# Patient Record
Sex: FEMALE | Race: WHITE | NOT HISPANIC OR LATINO
[De-identification: names, ages, dates, MRNs, and addresses within clinical notes are randomized per-mention and may not be internally consistent; named-entity substitution may affect disease eponyms.]

---

## 2017-01-03 ENCOUNTER — APPOINTMENT (OUTPATIENT)
Dept: UROLOGY | Facility: CLINIC | Age: 57
End: 2017-01-03

## 2017-01-03 ENCOUNTER — OUTPATIENT (OUTPATIENT)
Dept: OUTPATIENT SERVICES | Facility: HOSPITAL | Age: 57
LOS: 1 days | End: 2017-01-03
Payer: COMMERCIAL

## 2017-01-03 DIAGNOSIS — Z90.89 ACQUIRED ABSENCE OF OTHER ORGANS: Chronic | ICD-10-CM

## 2017-01-03 DIAGNOSIS — D30.02 BENIGN NEOPLASM OF LEFT KIDNEY: Chronic | ICD-10-CM

## 2017-01-03 DIAGNOSIS — Z90.49 ACQUIRED ABSENCE OF OTHER SPECIFIED PARTS OF DIGESTIVE TRACT: Chronic | ICD-10-CM

## 2017-01-03 DIAGNOSIS — R35.0 FREQUENCY OF MICTURITION: ICD-10-CM

## 2017-01-03 PROCEDURE — 76775 US EXAM ABDO BACK WALL LIM: CPT

## 2017-01-09 DIAGNOSIS — C64.9 MALIGNANT NEOPLASM OF UNSPECIFIED KIDNEY, EXCEPT RENAL PELVIS: ICD-10-CM

## 2017-06-03 ENCOUNTER — TRANSCRIPTION ENCOUNTER (OUTPATIENT)
Age: 57
End: 2017-06-03

## 2017-10-19 ENCOUNTER — TRANSCRIPTION ENCOUNTER (OUTPATIENT)
Age: 57
End: 2017-10-19

## 2017-11-30 ENCOUNTER — FORM ENCOUNTER (OUTPATIENT)
Age: 57
End: 2017-11-30

## 2017-12-01 ENCOUNTER — APPOINTMENT (OUTPATIENT)
Dept: UROLOGY | Facility: CLINIC | Age: 57
End: 2017-12-01
Payer: COMMERCIAL

## 2017-12-01 ENCOUNTER — APPOINTMENT (OUTPATIENT)
Dept: RADIOLOGY | Facility: IMAGING CENTER | Age: 57
End: 2017-12-01
Payer: COMMERCIAL

## 2017-12-01 ENCOUNTER — OUTPATIENT (OUTPATIENT)
Dept: OUTPATIENT SERVICES | Facility: HOSPITAL | Age: 57
LOS: 1 days | End: 2017-12-01
Payer: COMMERCIAL

## 2017-12-01 VITALS
SYSTOLIC BLOOD PRESSURE: 135 MMHG | HEIGHT: 64 IN | BODY MASS INDEX: 47.12 KG/M2 | DIASTOLIC BLOOD PRESSURE: 85 MMHG | RESPIRATION RATE: 17 BRPM | WEIGHT: 276 LBS | TEMPERATURE: 98.7 F | HEART RATE: 80 BPM

## 2017-12-01 DIAGNOSIS — Z90.49 ACQUIRED ABSENCE OF OTHER SPECIFIED PARTS OF DIGESTIVE TRACT: Chronic | ICD-10-CM

## 2017-12-01 DIAGNOSIS — R35.0 FREQUENCY OF MICTURITION: ICD-10-CM

## 2017-12-01 DIAGNOSIS — C64.9 MALIGNANT NEOPLASM OF UNSPECIFIED KIDNEY, EXCEPT RENAL PELVIS: ICD-10-CM

## 2017-12-01 DIAGNOSIS — D30.02 BENIGN NEOPLASM OF LEFT KIDNEY: Chronic | ICD-10-CM

## 2017-12-01 DIAGNOSIS — C64.1 MALIGNANT NEOPLASM OF RIGHT KIDNEY, EXCEPT RENAL PELVIS: ICD-10-CM

## 2017-12-01 DIAGNOSIS — Z90.89 ACQUIRED ABSENCE OF OTHER ORGANS: Chronic | ICD-10-CM

## 2017-12-01 PROCEDURE — 99214 OFFICE O/P EST MOD 30 MIN: CPT | Mod: 25

## 2017-12-01 PROCEDURE — 76775 US EXAM ABDO BACK WALL LIM: CPT | Mod: 26

## 2017-12-01 PROCEDURE — 76775 US EXAM ABDO BACK WALL LIM: CPT

## 2017-12-01 PROCEDURE — 71046 X-RAY EXAM CHEST 2 VIEWS: CPT

## 2017-12-01 PROCEDURE — 71020: CPT | Mod: 26

## 2017-12-05 DIAGNOSIS — C64.9 MALIGNANT NEOPLASM OF UNSPECIFIED KIDNEY, EXCEPT RENAL PELVIS: ICD-10-CM

## 2018-01-09 ENCOUNTER — APPOINTMENT (OUTPATIENT)
Dept: UROLOGY | Facility: CLINIC | Age: 58
End: 2018-01-09

## 2018-12-03 ENCOUNTER — FORM ENCOUNTER (OUTPATIENT)
Age: 58
End: 2018-12-03

## 2018-12-04 ENCOUNTER — OUTPATIENT (OUTPATIENT)
Dept: OUTPATIENT SERVICES | Facility: HOSPITAL | Age: 58
LOS: 1 days | End: 2018-12-04
Payer: COMMERCIAL

## 2018-12-04 ENCOUNTER — APPOINTMENT (OUTPATIENT)
Dept: UROLOGY | Facility: CLINIC | Age: 58
End: 2018-12-04
Payer: COMMERCIAL

## 2018-12-04 ENCOUNTER — APPOINTMENT (OUTPATIENT)
Dept: RADIOLOGY | Facility: IMAGING CENTER | Age: 58
End: 2018-12-04
Payer: COMMERCIAL

## 2018-12-04 ENCOUNTER — OTHER (OUTPATIENT)
Age: 58
End: 2018-12-04

## 2018-12-04 DIAGNOSIS — Z90.49 ACQUIRED ABSENCE OF OTHER SPECIFIED PARTS OF DIGESTIVE TRACT: Chronic | ICD-10-CM

## 2018-12-04 DIAGNOSIS — Z90.89 ACQUIRED ABSENCE OF OTHER ORGANS: Chronic | ICD-10-CM

## 2018-12-04 DIAGNOSIS — C64.9 MALIGNANT NEOPLASM OF UNSPECIFIED KIDNEY, EXCEPT RENAL PELVIS: ICD-10-CM

## 2018-12-04 DIAGNOSIS — D30.02 BENIGN NEOPLASM OF LEFT KIDNEY: Chronic | ICD-10-CM

## 2018-12-04 DIAGNOSIS — R35.0 FREQUENCY OF MICTURITION: ICD-10-CM

## 2018-12-04 DIAGNOSIS — Z00.8 ENCOUNTER FOR OTHER GENERAL EXAMINATION: ICD-10-CM

## 2018-12-04 PROCEDURE — 76775 US EXAM ABDO BACK WALL LIM: CPT | Mod: 26

## 2018-12-04 PROCEDURE — 71046 X-RAY EXAM CHEST 2 VIEWS: CPT | Mod: 26

## 2018-12-04 PROCEDURE — 76775 US EXAM ABDO BACK WALL LIM: CPT

## 2018-12-04 PROCEDURE — 71046 X-RAY EXAM CHEST 2 VIEWS: CPT

## 2018-12-04 PROCEDURE — 99214 OFFICE O/P EST MOD 30 MIN: CPT | Mod: 25

## 2018-12-10 ENCOUNTER — OTHER (OUTPATIENT)
Age: 58
End: 2018-12-10

## 2018-12-11 DIAGNOSIS — C64.9 MALIGNANT NEOPLASM OF UNSPECIFIED KIDNEY, EXCEPT RENAL PELVIS: ICD-10-CM

## 2018-12-17 ENCOUNTER — OUTPATIENT (OUTPATIENT)
Dept: OUTPATIENT SERVICES | Facility: HOSPITAL | Age: 58
LOS: 1 days | End: 2018-12-17
Payer: COMMERCIAL

## 2018-12-17 ENCOUNTER — APPOINTMENT (OUTPATIENT)
Dept: MRI IMAGING | Facility: IMAGING CENTER | Age: 58
End: 2018-12-17

## 2018-12-17 DIAGNOSIS — D30.02 BENIGN NEOPLASM OF LEFT KIDNEY: Chronic | ICD-10-CM

## 2018-12-17 DIAGNOSIS — C64.1 MALIGNANT NEOPLASM OF RIGHT KIDNEY, EXCEPT RENAL PELVIS: ICD-10-CM

## 2018-12-17 DIAGNOSIS — Z90.49 ACQUIRED ABSENCE OF OTHER SPECIFIED PARTS OF DIGESTIVE TRACT: Chronic | ICD-10-CM

## 2018-12-17 DIAGNOSIS — D17.71 BENIGN LIPOMATOUS NEOPLASM OF KIDNEY: ICD-10-CM

## 2018-12-17 DIAGNOSIS — Z90.89 ACQUIRED ABSENCE OF OTHER ORGANS: Chronic | ICD-10-CM

## 2018-12-17 PROCEDURE — 82565 ASSAY OF CREATININE: CPT

## 2018-12-28 DIAGNOSIS — D17.71 BENIGN LIPOMATOUS NEOPLASM OF KIDNEY: ICD-10-CM

## 2019-05-14 ENCOUNTER — OTHER (OUTPATIENT)
Age: 59
End: 2019-05-14

## 2019-05-27 ENCOUNTER — FORM ENCOUNTER (OUTPATIENT)
Age: 59
End: 2019-05-27

## 2019-05-28 ENCOUNTER — APPOINTMENT (OUTPATIENT)
Dept: CT IMAGING | Facility: IMAGING CENTER | Age: 59
End: 2019-05-28
Payer: COMMERCIAL

## 2019-05-28 ENCOUNTER — APPOINTMENT (OUTPATIENT)
Dept: UROLOGY | Facility: CLINIC | Age: 59
End: 2019-05-28
Payer: COMMERCIAL

## 2019-05-28 ENCOUNTER — OUTPATIENT (OUTPATIENT)
Dept: OUTPATIENT SERVICES | Facility: HOSPITAL | Age: 59
LOS: 1 days | End: 2019-05-28
Payer: COMMERCIAL

## 2019-05-28 VITALS
DIASTOLIC BLOOD PRESSURE: 77 MMHG | WEIGHT: 226 LBS | RESPIRATION RATE: 17 BRPM | TEMPERATURE: 98 F | SYSTOLIC BLOOD PRESSURE: 136 MMHG | BODY MASS INDEX: 38.58 KG/M2 | HEIGHT: 64 IN | HEART RATE: 70 BPM

## 2019-05-28 DIAGNOSIS — Z90.49 ACQUIRED ABSENCE OF OTHER SPECIFIED PARTS OF DIGESTIVE TRACT: Chronic | ICD-10-CM

## 2019-05-28 DIAGNOSIS — Z90.89 ACQUIRED ABSENCE OF OTHER ORGANS: Chronic | ICD-10-CM

## 2019-05-28 DIAGNOSIS — D17.71 BENIGN LIPOMATOUS NEOPLASM OF KIDNEY: ICD-10-CM

## 2019-05-28 DIAGNOSIS — D30.02 BENIGN NEOPLASM OF LEFT KIDNEY: Chronic | ICD-10-CM

## 2019-05-28 PROCEDURE — 74170 CT ABD WO CNTRST FLWD CNTRST: CPT

## 2019-05-28 PROCEDURE — 99214 OFFICE O/P EST MOD 30 MIN: CPT

## 2019-05-28 PROCEDURE — 74170 CT ABD WO CNTRST FLWD CNTRST: CPT | Mod: 26

## 2020-06-02 ENCOUNTER — OUTPATIENT (OUTPATIENT)
Dept: OUTPATIENT SERVICES | Facility: HOSPITAL | Age: 60
LOS: 1 days | End: 2020-06-02
Payer: COMMERCIAL

## 2020-06-02 ENCOUNTER — RESULT REVIEW (OUTPATIENT)
Age: 60
End: 2020-06-02

## 2020-06-02 ENCOUNTER — APPOINTMENT (OUTPATIENT)
Dept: UROLOGY | Facility: CLINIC | Age: 60
End: 2020-06-02
Payer: COMMERCIAL

## 2020-06-02 ENCOUNTER — APPOINTMENT (OUTPATIENT)
Dept: RADIOLOGY | Facility: IMAGING CENTER | Age: 60
End: 2020-06-02
Payer: COMMERCIAL

## 2020-06-02 VITALS
WEIGHT: 226 LBS | DIASTOLIC BLOOD PRESSURE: 89 MMHG | HEIGHT: 64 IN | BODY MASS INDEX: 38.58 KG/M2 | RESPIRATION RATE: 17 BRPM | SYSTOLIC BLOOD PRESSURE: 156 MMHG | HEART RATE: 83 BPM

## 2020-06-02 VITALS — TEMPERATURE: 98.2 F

## 2020-06-02 DIAGNOSIS — Z90.89 ACQUIRED ABSENCE OF OTHER ORGANS: Chronic | ICD-10-CM

## 2020-06-02 DIAGNOSIS — D30.02 BENIGN NEOPLASM OF LEFT KIDNEY: Chronic | ICD-10-CM

## 2020-06-02 DIAGNOSIS — C64.9 MALIGNANT NEOPLASM OF UNSPECIFIED KIDNEY, EXCEPT RENAL PELVIS: ICD-10-CM

## 2020-06-02 DIAGNOSIS — Z90.49 ACQUIRED ABSENCE OF OTHER SPECIFIED PARTS OF DIGESTIVE TRACT: Chronic | ICD-10-CM

## 2020-06-02 DIAGNOSIS — R35.0 FREQUENCY OF MICTURITION: ICD-10-CM

## 2020-06-02 DIAGNOSIS — D30.00 BENIGN NEOPLASM OF UNSPECIFIED KIDNEY: ICD-10-CM

## 2020-06-02 PROCEDURE — 76775 US EXAM ABDO BACK WALL LIM: CPT | Mod: 26

## 2020-06-02 PROCEDURE — 99214 OFFICE O/P EST MOD 30 MIN: CPT | Mod: 25

## 2020-06-02 PROCEDURE — 71046 X-RAY EXAM CHEST 2 VIEWS: CPT

## 2020-06-02 PROCEDURE — 71046 X-RAY EXAM CHEST 2 VIEWS: CPT | Mod: 26

## 2020-06-02 PROCEDURE — 76775 US EXAM ABDO BACK WALL LIM: CPT

## 2020-06-02 NOTE — PHYSICAL EXAM
[General Appearance - Well Nourished] : well nourished [General Appearance - Well Developed] : well developed [Bowel Sounds] : normal bowel sounds [Skin Color & Pigmentation] : normal skin color and pigmentation [Heart Rate And Rhythm] : Heart rate and rhythm were normal [] : no respiratory distress [Oriented To Time, Place, And Person] : oriented to person, place, and time [Normal Station and Gait] : the gait and station were normal for the patient's age [No Focal Deficits] : no focal deficits [No Palpable Adenopathy] : no palpable adenopathy

## 2020-06-02 NOTE — ASSESSMENT
[FreeTextEntry1] : We reviewed the images and are concerned that she may have recurrence . We discussed MRI but she is claustrophobic. \par We will do CT abdomen  .

## 2020-06-02 NOTE — HISTORY OF PRESENT ILLNESS
[FreeTextEntry1] : Right partial nephrectomy pT1a FG 2 and left AML which was embolized. \par \par She is doing well  [None] : no symptoms

## 2020-06-03 ENCOUNTER — APPOINTMENT (OUTPATIENT)
Dept: CT IMAGING | Facility: IMAGING CENTER | Age: 60
End: 2020-06-03
Payer: COMMERCIAL

## 2020-06-03 ENCOUNTER — OUTPATIENT (OUTPATIENT)
Dept: OUTPATIENT SERVICES | Facility: HOSPITAL | Age: 60
LOS: 1 days | End: 2020-06-03
Payer: COMMERCIAL

## 2020-06-03 DIAGNOSIS — Z90.89 ACQUIRED ABSENCE OF OTHER ORGANS: Chronic | ICD-10-CM

## 2020-06-03 DIAGNOSIS — D30.02 BENIGN NEOPLASM OF LEFT KIDNEY: Chronic | ICD-10-CM

## 2020-06-03 DIAGNOSIS — C64.9 MALIGNANT NEOPLASM OF UNSPECIFIED KIDNEY, EXCEPT RENAL PELVIS: ICD-10-CM

## 2020-06-03 DIAGNOSIS — Z90.49 ACQUIRED ABSENCE OF OTHER SPECIFIED PARTS OF DIGESTIVE TRACT: Chronic | ICD-10-CM

## 2020-06-03 DIAGNOSIS — N28.89 OTHER SPECIFIED DISORDERS OF KIDNEY AND URETER: ICD-10-CM

## 2020-06-03 PROCEDURE — 74170 CT ABD WO CNTRST FLWD CNTRST: CPT | Mod: 26

## 2020-06-03 PROCEDURE — 74170 CT ABD WO CNTRST FLWD CNTRST: CPT

## 2020-06-05 DIAGNOSIS — D30.00 BENIGN NEOPLASM OF UNSPECIFIED KIDNEY: ICD-10-CM

## 2020-06-05 DIAGNOSIS — C64.9 MALIGNANT NEOPLASM OF UNSPECIFIED KIDNEY, EXCEPT RENAL PELVIS: ICD-10-CM

## 2021-07-20 ENCOUNTER — APPOINTMENT (OUTPATIENT)
Dept: UROLOGY | Facility: CLINIC | Age: 61
End: 2021-07-20

## 2023-05-22 ENCOUNTER — APPOINTMENT (OUTPATIENT)
Dept: CT IMAGING | Facility: IMAGING CENTER | Age: 63
End: 2023-05-22
Payer: MEDICARE

## 2023-05-22 ENCOUNTER — OUTPATIENT (OUTPATIENT)
Dept: OUTPATIENT SERVICES | Facility: HOSPITAL | Age: 63
LOS: 1 days | End: 2023-05-22
Payer: MEDICARE

## 2023-05-22 DIAGNOSIS — Z90.89 ACQUIRED ABSENCE OF OTHER ORGANS: Chronic | ICD-10-CM

## 2023-05-22 DIAGNOSIS — D30.02 BENIGN NEOPLASM OF LEFT KIDNEY: Chronic | ICD-10-CM

## 2023-05-22 DIAGNOSIS — C64.9 MALIGNANT NEOPLASM OF UNSPECIFIED KIDNEY, EXCEPT RENAL PELVIS: ICD-10-CM

## 2023-05-22 DIAGNOSIS — Z90.49 ACQUIRED ABSENCE OF OTHER SPECIFIED PARTS OF DIGESTIVE TRACT: Chronic | ICD-10-CM

## 2023-05-22 PROCEDURE — 74160 CT ABDOMEN W/CONTRAST: CPT | Mod: 26

## 2023-05-22 PROCEDURE — 74160 CT ABDOMEN W/CONTRAST: CPT

## 2023-05-23 ENCOUNTER — APPOINTMENT (OUTPATIENT)
Dept: UROLOGY | Facility: CLINIC | Age: 63
End: 2023-05-23

## 2023-05-23 ENCOUNTER — APPOINTMENT (OUTPATIENT)
Dept: UROLOGY | Facility: CLINIC | Age: 63
End: 2023-05-23
Payer: MEDICARE

## 2023-05-23 VITALS
HEART RATE: 80 BPM | BODY MASS INDEX: 50.02 KG/M2 | RESPIRATION RATE: 17 BRPM | TEMPERATURE: 98.5 F | SYSTOLIC BLOOD PRESSURE: 158 MMHG | WEIGHT: 293 LBS | HEIGHT: 64 IN | DIASTOLIC BLOOD PRESSURE: 94 MMHG

## 2023-05-23 DIAGNOSIS — N28.89 OTHER SPECIFIED DISORDERS OF KIDNEY AND URETER: ICD-10-CM

## 2023-05-23 DIAGNOSIS — D17.71 BENIGN LIPOMATOUS NEOPLASM OF KIDNEY: ICD-10-CM

## 2023-05-23 DIAGNOSIS — C64.9 MALIGNANT NEOPLASM OF UNSPECIFIED KIDNEY, EXCEPT RENAL PELVIS: ICD-10-CM

## 2023-05-23 PROCEDURE — 99213 OFFICE O/P EST LOW 20 MIN: CPT
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
## 2023-05-23 NOTE — PHYSICAL EXAM
[General Appearance - Well Developed] : well developed [General Appearance - Well Nourished] : well nourished [Heart Rate And Rhythm] : Heart rate and rhythm were normal [] : no respiratory distress [Oriented To Time, Place, And Person] : oriented to person, place, and time [Normal Station and Gait] : the gait and station were normal for the patient's age

## 2023-05-23 NOTE — HISTORY OF PRESENT ILLNESS
[FreeTextEntry1] : Right Partial nephrectomy in 2016 for RCC pT1a FG 2 Also a left AML which was embolized . [None] : no symptoms

## 2023-07-16 DIAGNOSIS — Z00.00 ENCOUNTER FOR GENERAL ADULT MEDICAL EXAMINATION W/OUT ABNORMAL FINDINGS: ICD-10-CM

## 2023-07-16 DIAGNOSIS — Z29.9 ENCOUNTER FOR PROPHYLACTIC MEASURES, UNSPECIFIED: ICD-10-CM

## 2023-07-17 ENCOUNTER — NON-APPOINTMENT (OUTPATIENT)
Age: 63
End: 2023-07-17

## 2023-07-17 ENCOUNTER — APPOINTMENT (OUTPATIENT)
Dept: INTERNAL MEDICINE | Facility: CLINIC | Age: 63
End: 2023-07-17
Payer: MEDICARE

## 2023-07-17 VITALS
RESPIRATION RATE: 17 BRPM | TEMPERATURE: 98.3 F | HEIGHT: 64 IN | WEIGHT: 270 LBS | HEART RATE: 65 BPM | OXYGEN SATURATION: 94 % | BODY MASS INDEX: 46.1 KG/M2

## 2023-07-17 DIAGNOSIS — E66.01 MORBID (SEVERE) OBESITY DUE TO EXCESS CALORIES: ICD-10-CM

## 2023-07-17 DIAGNOSIS — Z12.31 ENCOUNTER FOR SCREENING MAMMOGRAM FOR MALIGNANT NEOPLASM OF BREAST: ICD-10-CM

## 2023-07-17 DIAGNOSIS — Z13.31 ENCOUNTER FOR SCREENING FOR DEPRESSION: ICD-10-CM

## 2023-07-17 DIAGNOSIS — Z12.11 ENCOUNTER FOR SCREENING FOR MALIGNANT NEOPLASM OF COLON: ICD-10-CM

## 2023-07-17 PROCEDURE — G0296 VISIT TO DETERM LDCT ELIG: CPT

## 2023-07-17 PROCEDURE — G0439: CPT

## 2023-07-17 PROCEDURE — G0403: CPT

## 2023-07-17 PROCEDURE — G0402 INITIAL PREVENTIVE EXAM: CPT

## 2023-07-17 PROCEDURE — G0447 BEHAVIOR COUNSEL OBESITY 15M: CPT

## 2023-07-17 PROCEDURE — 36415 COLL VENOUS BLD VENIPUNCTURE: CPT

## 2023-07-17 NOTE — PHYSICAL EXAM
[No Acute Distress] : no acute distress [Normal Sclera/Conjunctiva] : normal sclera/conjunctiva [Normal Oropharynx] : the oropharynx was normal [PERRL] : pupils equal round and reactive to light [Normal TMs] : both tympanic membranes were normal [No Lymphadenopathy] : no lymphadenopathy [Supple] : supple [Thyroid Normal, No Nodules] : the thyroid was normal and there were no nodules present [No Edema] : there was no peripheral edema [Declined Breast Exam] : declined breast exam  [Declined Rectal Exam] : declined rectal exam [Normal Posterior Cervical Nodes] : no posterior cervical lymphadenopathy [Normal] : no jugular venous distention, supple, no lymphadenopathy and the thyroid was normal and there were no nodules present [Normal Anterior Cervical Nodes] : no anterior cervical lymphadenopathy [de-identified] : declined

## 2023-07-17 NOTE — HISTORY OF PRESENT ILLNESS
[de-identified] : 63 year F here for Complete Physical Exam.\par Pt is daily exercising?  No\par Vaccinations:\par covid - pfizer x 2\par flu - due this fall. - deferred at the moment \par tdap - got in past 10 years\par shingles - due for shingles - declined\par pcv- deferred but highly recommend\par \par Screening:\par last colonoscopy: due for one. \par denies any unintentional weight loss, blood in stool, anemia or dysphagia of solids or liquids\par no family hx of colon cancer\par \par obgyn: need one\par LMP: at 48 years old\par last pap: due for pap - will see obgyn\par last mammo: due for mammo - see obgyn\par Pregnancy:  - 1 son - 38 healthy\par no family hx of breast cancer\par \par \par Past Medical History: \par Right Partial nephrectomy in 2016 for RCC \par Patient is currently experiencing no symptoms. \par HTN amlodipine 5mg - pt reports did not take BP medication today\par Angiomyolipoma of left kidney\par Benign neoplasm of kidney\par Cancer of kidney, right \par Right renal mass \par \par Allergies: NKDA\par Medications:\par amLODIPine Besylate 5 MG Oral Tablet\par Surgical Hx:\par Cholecystectomy\par Tonsillectomy \par Partial Nephrectomy of R kidney\par Family Hx:\par Mother: ALS\par Father: CAD - at age 62 MI fatal\par Siblings: 1 brother - CAD and lung cancer passed at 55\par Children: 1 son 38\par pt denies any family hx of breast, colon, cervical, endometrial, uterine, ovarian, prostate  or testicular cancer\par Social\par ETOH - socially \par Smoker -former quit 10 years ago 1.5 ppd and started at age 13\par Illicit Drug use - denies\par Occupation: \par Pt has no acute complaints\par \par

## 2023-07-17 NOTE — COUNSELING
[Good understanding] : Patient has a good understanding of disease, goals and obesity follow-up plan [ - Annual Lung Cancer Screening/Share Decision Making Discussion] : Annual Lung Cancer Screening/Share Decision Making Discussion. (I have advised this patient to have a Low Dose CT (LDCT) scan of the lungs and have discussed the following with the patient in a shared decision making discussion:   Benefits of Detection and Early Treatment: There is adequate evidence that annual screening for lung cancer with LDCT in a population of high-risk persons can prevent a substantial number of lung cancer–related deaths. The magnitude of benefit depends on the individual patient's risk for lung cancer, as those who are at highest risk are most likely to benefit. Screening cannot prevent most lung cancer–related deaths, and does not replace smoking cessation. Harms of Detection and Early Intervention and Treatment: The harms associated with LDCT screening include false-negative and false-positive results, incidental findings, over diagnosis, and radiation exposure. False-positive LDCT results occur in a substantial proportion of screened persons; 95% of all positive results do not lead to a diagnosis of cancer. In a high-quality screening program, further imaging can resolve most false-positive results; however, some patients may require invasive procedures. Radiation harms, including cancer resulting from cumulative exposure to radiation, vary depending on the age at the start of screening; the number of scans received; and the person's exposure to other sources of radiation, particularly other medical imaging.) [FreeTextEntry4] : 15

## 2023-07-17 NOTE — HEALTH RISK ASSESSMENT
[No] : No [No falls in past year] : Patient reported no falls in the past year [PHQ-2 Negative - No further assessment needed] : PHQ-2 Negative - No further assessment needed [0] : 2) Feeling down, depressed, or hopeless: Not at all (0) [None] : None [With Family] : lives with family [Feels Safe at Home] : Feels safe at home [Fully functional (bathing, dressing, toileting, transferring, walking, feeding)] : Fully functional (bathing, dressing, toileting, transferring, walking, feeding) [Fully functional (using the telephone, shopping, preparing meals, housekeeping, doing laundry, using] : Fully functional and needs no help or supervision to perform IADLs (using the telephone, shopping, preparing meals, housekeeping, doing laundry, using transportation, managing medications and managing finances) [Reports normal functional visual acuity (ie: able to read med bottle)] : Reports normal functional visual acuity [Smoke Detector] : smoke detector [Seat Belt] :  uses seat belt [Sunscreen] : uses sunscreen [With Patient/Caregiver] : , with patient/caregiver [Designated Healthcare Proxy] : Designated healthcare proxy [Name: ___] : Health Care Proxy's Name: [unfilled]  [Relationship: ___] : Relationship: [unfilled] [I will adhere to the patient's wishes.] : I will adhere to the patient's wishes. [Time Spent: ___ minutes] : Time Spent: [unfilled] minutes [Former] : Former [HIV test declined] : HIV test declined [Hepatitis C test offered] : Hepatitis C test offered [Employed] : employed [High School] : high school [# Of Children ___] : has [unfilled] children [Carbon Monoxide Detector] : carbon monoxide detector [Safety elements used in home] : safety elements used in home [< 15 Years] : < 15 Years [[id number]] : 0 [Change in mental status noted] : No change in mental status noted [Language] : denies difficulty with language [Behavior] : denies difficulty with behavior [Learning/Retaining New Information] : denies difficulty learning/retaining new information [Handling Complex Tasks] : denies difficulty handling complex tasks [Reasoning] : denies difficulty with reasoning [Spatial Ability and Orientation] : denies difficulty with spatial ability and orientation [Sexually Active] : not sexually active [High Risk Behavior] : no high risk behavior [Reports changes in hearing] : Reports no changes in hearing [Reports changes in vision] : Reports no changes in vision [Reports changes in dental health] : Reports no changes in dental health [Guns at Home] : no guns at home [Travel to Developing Areas] : does not  travel to developing areas [TB Exposure] : is not being exposed to tuberculosis [Caregiver Concerns] : does not have caregiver concerns [FreeTextEntry4] : TBD

## 2023-07-17 NOTE — ASSESSMENT
[FreeTextEntry1] : #CPE\par f/u blood and urine\par ekg - NSR hx or RBBB\par immunizations - declined shingles, pcv and flu shot\par I spent 5 minutes with the patient conducting a screen using approved screening tool (PHQ2) and discussing results of said screen with patient during this encounter.\par The patient was counseled to get regular exercise and sleep, wear sunblock and wear a safety belt in the car.\par Check CBC, CMP Hgba1c , TSH and UA\par Pap Smear\par Mammo\par Colonoscopy \par Ophtho\par Derm\par Dental\par Cardio - following Dr. Harmon at [facility] - pt will send records from Dr. Harmon office\par #Severe Obesity\par Diet and Exercise\par Setting realistic weight loss goals, such as a one- to two-pound weight loss per week.\par Eating fewer calories by cutting down on portion sizes. \par Aiming for at least five small handfuls of fruits and vegetables per day.\par Choosing foods high in fiber, such as whole grain breads, cereals, pasta, rice, fruits and vegetables. These foods will give you more chewing satisfaction, while the higher fiber content may make you feel castrejon on fewer calories.\par Keep Food Journal\par 15 min spent on obesity discussion\par #RCC following urology Dr. Troy\par #Lung Cancer Screening\par f/u low dose CT of chest\par #HTN\par renewal of amlodpine 5mg qd\par f/u 3 weeks for bp check\par DASH DIET \par Exercise.\par Lower your salt intake.\par Reduce your alcohol consumption.\par Learn relaxation methods.\par Eating is a very important part of controlling blood pressure. Recommend Total salt intake to 2.4g/day.\par Do not add salt while preparing or eating your meals.\par Try to avoid red meats, sweets and sugar containing beverages.\par Ensure you are eating 5 fruits and vegetables a day as well as whole grains.\par \par pt agrees and understands plan via teach back method. all questions answered.\par \par

## 2023-07-18 DIAGNOSIS — R82.90 UNSPECIFIED ABNORMAL FINDINGS IN URINE: ICD-10-CM

## 2023-07-18 DIAGNOSIS — E55.9 VITAMIN D DEFICIENCY, UNSPECIFIED: ICD-10-CM

## 2023-07-18 LAB
25(OH)D3 SERPL-MCNC: 10.7 NG/ML
ALBUMIN SERPL ELPH-MCNC: 4.8 G/DL
ALP BLD-CCNC: 103 U/L
ALT SERPL-CCNC: 21 U/L
ANION GAP SERPL CALC-SCNC: 15 MMOL/L
APPEARANCE: ABNORMAL
AST SERPL-CCNC: 17 U/L
BACTERIA: ABNORMAL /HPF
BILIRUB SERPL-MCNC: 0.4 MG/DL
BILIRUBIN URINE: NEGATIVE
BLOOD URINE: NEGATIVE
BUN SERPL-MCNC: 14 MG/DL
CALCIUM OXALATE CRYSTALS: PRESENT
CALCIUM SERPL-MCNC: 10 MG/DL
CAST: 0 /LPF
CHLORIDE SERPL-SCNC: 106 MMOL/L
CHOLEST SERPL-MCNC: 140 MG/DL
CO2 SERPL-SCNC: 23 MMOL/L
COLOR: NORMAL
CREAT SERPL-MCNC: 0.7 MG/DL
EGFR: 96 ML/MIN/1.73M2
EPITHELIAL CELLS: 13 /HPF
ESTIMATED AVERAGE GLUCOSE: 123 MG/DL
FOLATE SERPL-MCNC: 8.3 NG/ML
GLUCOSE QUALITATIVE U: NEGATIVE MG/DL
GLUCOSE SERPL-MCNC: 99 MG/DL
HBA1C MFR BLD HPLC: 5.9 %
HBV SURFACE AB SERPL IA-ACNC: <3 MIU/ML
HCV AB SER QL: NONREACTIVE
HCV S/CO RATIO: 0.1 S/CO
HDLC SERPL-MCNC: 42 MG/DL
KETONES URINE: NEGATIVE MG/DL
LDLC SERPL CALC-MCNC: 76 MG/DL
LEUKOCYTE ESTERASE URINE: NEGATIVE
MICROSCOPIC-UA: NORMAL
NITRITE URINE: NEGATIVE
NONHDLC SERPL-MCNC: 98 MG/DL
PH URINE: 5.5
POTASSIUM SERPL-SCNC: 4 MMOL/L
PROT SERPL-MCNC: 7.6 G/DL
PROTEIN URINE: 30 MG/DL
RED BLOOD CELLS URINE: NORMAL /HPF
SODIUM SERPL-SCNC: 144 MMOL/L
SPECIFIC GRAVITY URINE: 1.03
TRIGL SERPL-MCNC: 124 MG/DL
TSH SERPL-ACNC: 0.52 UIU/ML
UROBILINOGEN URINE: 1 MG/DL
VIT B12 SERPL-MCNC: 300 PG/ML
WHITE BLOOD CELLS URINE: 2 /HPF

## 2023-07-27 ENCOUNTER — NON-APPOINTMENT (OUTPATIENT)
Age: 63
End: 2023-07-27

## 2023-07-27 VITALS — WEIGHT: 270 LBS | HEIGHT: 64 IN | BODY MASS INDEX: 46.1 KG/M2

## 2023-07-27 DIAGNOSIS — Z87.891 PERSONAL HISTORY OF NICOTINE DEPENDENCE: ICD-10-CM

## 2023-07-27 NOTE — HISTORY OF PRESENT ILLNESS
[Former] : Former [TextBox_13] : Referred by Dr. Gabe Greene.\par \par Ms. PURI is a 63 year old female with a history of HTN, right renal cell ca s/p partial nephrectomy.\par \par She was called to review eligibility for Low-Dose CT lung cancer screening.  Reviewed and confirmed that the patient meets screening eligibility criteria:\par \par 63 years old \par \par Smoking Status: Former smoker  \par \par Number of pack(s) per day: 1.5\par Number of years smoked: 40\par Number of pack years smokin\par \par Number of years since quitting smokin\par Quit year: \par \par No symptoms of lung cancer, including new cough, change in cough, hemoptysis, and unintentional weight loss.\par \par No personal history of lung cancer.  History of lung cancer in a first degree relative, her brother.  No history of lung disease or occupational exposures. [YearQuit] : 2011 [PacksperDay] : 1.5 [N_Years] : 40 [PacksperYear] : 60

## 2023-07-31 ENCOUNTER — APPOINTMENT (OUTPATIENT)
Dept: CT IMAGING | Facility: CLINIC | Age: 63
End: 2023-07-31
Payer: MEDICARE

## 2023-07-31 PROCEDURE — 71271 CT THORAX LUNG CANCER SCR C-: CPT

## 2023-08-02 ENCOUNTER — RESULT REVIEW (OUTPATIENT)
Age: 63
End: 2023-08-02

## 2023-08-02 ENCOUNTER — NON-APPOINTMENT (OUTPATIENT)
Age: 63
End: 2023-08-02

## 2023-08-02 DIAGNOSIS — R91.1 SOLITARY PULMONARY NODULE: ICD-10-CM

## 2023-08-05 NOTE — PHYSICAL EXAM
[No Edema] : there was no peripheral edema [Normal Posterior Cervical Nodes] : no posterior cervical lymphadenopathy [Normal Anterior Cervical Nodes] : no anterior cervical lymphadenopathy [Normal] : affect was normal and insight and judgment were intact

## 2023-08-05 NOTE — HISTORY OF PRESENT ILLNESS
[de-identified] : 63 F here for f/u HTN currently on amlodipine 5mg qd compliant with medication does not report any headache, blurry vision, epistaxis, dizziness, chest pain, sob, or palpitations or leg swelling Following a low salt diet and exercising vit d 10.7 - - need to start drisdol 50,000 units 1 capsule qweek for 8 weeks followed by 2000 units D3 otc once a day for 3 months.- sent rx low hep b immunity need x 2 shots 1 month apart. a1c 5.9 prediabetic. continue to watch sugar, low carb diet, low starch, diet and exercise. lifestyle modification.

## 2023-08-07 ENCOUNTER — TRANSCRIPTION ENCOUNTER (OUTPATIENT)
Age: 63
End: 2023-08-07

## 2023-08-07 ENCOUNTER — APPOINTMENT (OUTPATIENT)
Dept: INTERNAL MEDICINE | Facility: CLINIC | Age: 63
End: 2023-08-07

## 2023-08-07 ENCOUNTER — RESULT REVIEW (OUTPATIENT)
Age: 63
End: 2023-08-07

## 2023-08-07 ENCOUNTER — APPOINTMENT (OUTPATIENT)
Dept: MAMMOGRAPHY | Facility: CLINIC | Age: 63
End: 2023-08-07
Payer: MEDICARE

## 2023-08-07 ENCOUNTER — APPOINTMENT (OUTPATIENT)
Dept: ULTRASOUND IMAGING | Facility: CLINIC | Age: 63
End: 2023-08-07
Payer: MEDICARE

## 2023-08-07 ENCOUNTER — NON-APPOINTMENT (OUTPATIENT)
Age: 63
End: 2023-08-07

## 2023-08-07 DIAGNOSIS — R92.8 OTHER ABNORMAL AND INCONCLUSIVE FINDINGS ON DIAGNOSTIC IMAGING OF BREAST: ICD-10-CM

## 2023-08-07 PROCEDURE — 77066 DX MAMMO INCL CAD BI: CPT

## 2023-08-07 PROCEDURE — 76641 ULTRASOUND BREAST COMPLETE: CPT | Mod: LT

## 2023-08-07 PROCEDURE — G0279: CPT

## 2023-08-11 DIAGNOSIS — F41.9 ANXIETY DISORDER, UNSPECIFIED: ICD-10-CM

## 2023-08-14 ENCOUNTER — RESULT REVIEW (OUTPATIENT)
Age: 63
End: 2023-08-14

## 2023-08-14 ENCOUNTER — APPOINTMENT (OUTPATIENT)
Dept: ULTRASOUND IMAGING | Facility: CLINIC | Age: 63
End: 2023-08-14
Payer: MEDICARE

## 2023-08-14 ENCOUNTER — OUTPATIENT (OUTPATIENT)
Dept: OUTPATIENT SERVICES | Facility: HOSPITAL | Age: 63
LOS: 1 days | End: 2023-08-14
Payer: MEDICARE

## 2023-08-14 ENCOUNTER — APPOINTMENT (OUTPATIENT)
Dept: INTERNAL MEDICINE | Facility: CLINIC | Age: 63
End: 2023-08-14

## 2023-08-14 DIAGNOSIS — Z12.31 ENCOUNTER FOR SCREENING MAMMOGRAM FOR MALIGNANT NEOPLASM OF BREAST: ICD-10-CM

## 2023-08-14 DIAGNOSIS — Z90.89 ACQUIRED ABSENCE OF OTHER ORGANS: Chronic | ICD-10-CM

## 2023-08-14 DIAGNOSIS — D30.02 BENIGN NEOPLASM OF LEFT KIDNEY: Chronic | ICD-10-CM

## 2023-08-14 DIAGNOSIS — Z90.49 ACQUIRED ABSENCE OF OTHER SPECIFIED PARTS OF DIGESTIVE TRACT: Chronic | ICD-10-CM

## 2023-08-14 PROCEDURE — 77065 DX MAMMO INCL CAD UNI: CPT

## 2023-08-14 PROCEDURE — 19083 BX BREAST 1ST LESION US IMAG: CPT | Mod: LT

## 2023-08-14 PROCEDURE — 88360 TUMOR IMMUNOHISTOCHEM/MANUAL: CPT | Mod: 26

## 2023-08-14 PROCEDURE — 19083 BX BREAST 1ST LESION US IMAG: CPT

## 2023-08-14 PROCEDURE — 88360 TUMOR IMMUNOHISTOCHEM/MANUAL: CPT

## 2023-08-14 PROCEDURE — 77065 DX MAMMO INCL CAD UNI: CPT | Mod: 26,LT

## 2023-08-14 PROCEDURE — 88305 TISSUE EXAM BY PATHOLOGIST: CPT

## 2023-08-14 PROCEDURE — A4648: CPT

## 2023-08-14 PROCEDURE — 88305 TISSUE EXAM BY PATHOLOGIST: CPT | Mod: 26

## 2023-08-16 ENCOUNTER — NON-APPOINTMENT (OUTPATIENT)
Age: 63
End: 2023-08-16

## 2023-08-17 ENCOUNTER — TRANSCRIPTION ENCOUNTER (OUTPATIENT)
Age: 63
End: 2023-08-17

## 2023-08-18 ENCOUNTER — TRANSCRIPTION ENCOUNTER (OUTPATIENT)
Age: 63
End: 2023-08-18

## 2023-08-21 ENCOUNTER — APPOINTMENT (OUTPATIENT)
Dept: SURGICAL ONCOLOGY | Facility: CLINIC | Age: 63
End: 2023-08-21
Payer: MEDICARE

## 2023-08-21 VITALS
OXYGEN SATURATION: 96 % | BODY MASS INDEX: 46.1 KG/M2 | HEIGHT: 64 IN | HEART RATE: 76 BPM | DIASTOLIC BLOOD PRESSURE: 86 MMHG | SYSTOLIC BLOOD PRESSURE: 162 MMHG | RESPIRATION RATE: 16 BRPM | WEIGHT: 270 LBS

## 2023-08-21 PROCEDURE — 99205 OFFICE O/P NEW HI 60 MIN: CPT

## 2023-08-22 ENCOUNTER — NON-APPOINTMENT (OUTPATIENT)
Age: 63
End: 2023-08-22

## 2023-08-24 ENCOUNTER — OUTPATIENT (OUTPATIENT)
Dept: OUTPATIENT SERVICES | Facility: HOSPITAL | Age: 63
LOS: 1 days | End: 2023-08-24
Payer: MEDICARE

## 2023-08-24 ENCOUNTER — APPOINTMENT (OUTPATIENT)
Dept: SURGICAL ONCOLOGY | Facility: CLINIC | Age: 63
End: 2023-08-24

## 2023-08-24 ENCOUNTER — APPOINTMENT (OUTPATIENT)
Dept: MRI IMAGING | Facility: IMAGING CENTER | Age: 63
End: 2023-08-24
Payer: MEDICARE

## 2023-08-24 DIAGNOSIS — Z90.49 ACQUIRED ABSENCE OF OTHER SPECIFIED PARTS OF DIGESTIVE TRACT: Chronic | ICD-10-CM

## 2023-08-24 DIAGNOSIS — D30.02 BENIGN NEOPLASM OF LEFT KIDNEY: Chronic | ICD-10-CM

## 2023-08-24 DIAGNOSIS — C50.312 MALIGNANT NEOPLASM OF LOWER-INNER QUADRANT OF LEFT FEMALE BREAST: ICD-10-CM

## 2023-08-24 DIAGNOSIS — Z90.89 ACQUIRED ABSENCE OF OTHER ORGANS: Chronic | ICD-10-CM

## 2023-08-24 PROCEDURE — 77049 MRI BREAST C-+ W/CAD BI: CPT | Mod: 26

## 2023-08-24 PROCEDURE — C8908: CPT

## 2023-08-24 PROCEDURE — C8937: CPT

## 2023-08-24 PROCEDURE — A9585: CPT

## 2023-08-25 ENCOUNTER — OUTPATIENT (OUTPATIENT)
Dept: OUTPATIENT SERVICES | Facility: HOSPITAL | Age: 63
LOS: 1 days | Discharge: HOME | End: 2023-08-25

## 2023-08-25 DIAGNOSIS — C44.191 OTHER SPECIFIED MALIGNANT NEOPLASM OF SKIN OF UNSPECIFIED EYELID, INCLUDING CANTHUS: ICD-10-CM

## 2023-08-25 DIAGNOSIS — D30.02 BENIGN NEOPLASM OF LEFT KIDNEY: Chronic | ICD-10-CM

## 2023-08-25 DIAGNOSIS — Z90.89 ACQUIRED ABSENCE OF OTHER ORGANS: Chronic | ICD-10-CM

## 2023-08-25 DIAGNOSIS — Z90.49 ACQUIRED ABSENCE OF OTHER SPECIFIED PARTS OF DIGESTIVE TRACT: Chronic | ICD-10-CM

## 2023-08-28 ENCOUNTER — APPOINTMENT (OUTPATIENT)
Dept: HEMATOLOGY ONCOLOGY | Facility: CLINIC | Age: 63
End: 2023-08-28

## 2023-08-28 ENCOUNTER — RESULT REVIEW (OUTPATIENT)
Age: 63
End: 2023-08-28

## 2023-08-28 ENCOUNTER — OUTPATIENT (OUTPATIENT)
Dept: OUTPATIENT SERVICES | Facility: HOSPITAL | Age: 63
LOS: 1 days | End: 2023-08-28
Payer: MEDICARE

## 2023-08-28 ENCOUNTER — APPOINTMENT (OUTPATIENT)
Dept: MRI IMAGING | Facility: CLINIC | Age: 63
End: 2023-08-28
Payer: MEDICARE

## 2023-08-28 DIAGNOSIS — D30.02 BENIGN NEOPLASM OF LEFT KIDNEY: Chronic | ICD-10-CM

## 2023-08-28 DIAGNOSIS — Z90.89 ACQUIRED ABSENCE OF OTHER ORGANS: Chronic | ICD-10-CM

## 2023-08-28 DIAGNOSIS — C50.312 MALIGNANT NEOPLASM OF LOWER-INNER QUADRANT OF LEFT FEMALE BREAST: ICD-10-CM

## 2023-08-28 DIAGNOSIS — Z90.49 ACQUIRED ABSENCE OF OTHER SPECIFIED PARTS OF DIGESTIVE TRACT: Chronic | ICD-10-CM

## 2023-08-28 PROCEDURE — A9585: CPT

## 2023-08-28 PROCEDURE — 77065 DX MAMMO INCL CAD UNI: CPT

## 2023-08-28 PROCEDURE — 19085 BX BREAST 1ST LESION MR IMAG: CPT | Mod: LT

## 2023-08-28 PROCEDURE — 19085 BX BREAST 1ST LESION MR IMAG: CPT

## 2023-08-28 PROCEDURE — A4648: CPT

## 2023-08-28 PROCEDURE — 88305 TISSUE EXAM BY PATHOLOGIST: CPT | Mod: 26

## 2023-08-28 PROCEDURE — 77065 DX MAMMO INCL CAD UNI: CPT | Mod: 26,LT

## 2023-09-06 ENCOUNTER — APPOINTMENT (OUTPATIENT)
Dept: HEMATOLOGY ONCOLOGY | Facility: CLINIC | Age: 63
End: 2023-09-06
Payer: MEDICARE

## 2023-09-06 VITALS
TEMPERATURE: 97.9 F | HEART RATE: 86 BPM | DIASTOLIC BLOOD PRESSURE: 81 MMHG | BODY MASS INDEX: 47.69 KG/M2 | RESPIRATION RATE: 17 BRPM | OXYGEN SATURATION: 97 % | WEIGHT: 279.33 LBS | SYSTOLIC BLOOD PRESSURE: 140 MMHG | HEIGHT: 63.98 IN

## 2023-09-06 PROCEDURE — 99204 OFFICE O/P NEW MOD 45 MIN: CPT

## 2023-09-06 RX ORDER — ALPRAZOLAM 0.5 MG/1
0.5 TABLET ORAL
Qty: 3 | Refills: 0 | Status: DISCONTINUED | COMMUNITY
Start: 2023-08-25 | End: 2023-09-06

## 2023-09-06 RX ORDER — ALPRAZOLAM 0.5 MG/1
0.5 TABLET ORAL DAILY
Qty: 10 | Refills: 0 | Status: DISCONTINUED | COMMUNITY
Start: 2023-08-11 | End: 2023-09-06

## 2023-09-06 NOTE — ASSESSMENT
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 We reviewed mucinous histology and usually adjuvant recommendations for tumors greater than 3 cm: endocrine therapy. We reviewed that endocrine therapy could be considered neoadjuvant if desire was present to decrease breast mass if needed for lumpectomy. We reviewed no chemotherapy is recommended as long as no lymph nodes affected and only mucinous tumor found on full pathology since mucinous is a more benign behaving cancer.  We reviewed the different endocrine options. We reviewed anastrozole one tablet a day for up to 5 to 10 years. We reviewed the potential side effects of endocrine therapy including but not limited to: hot flash, GI upset, bone stiffness/ arthralgias, fatigue, and decrease in bone density. We reviewed supportive measures to decrease these side effects. We reviewed bone health with calcium and Vitamin D supplementation. Written information on anastrozole was provided to her. Questions were answered to her satisfaction. She will decide on her surgical options once genetic testing returns and will plan to have follow up after surgery.

## 2023-09-06 NOTE — PHYSICAL EXAM
[Fully active, able to carry on all pre-disease performance without restriction] : Status 0 - Fully active, able to carry on all pre-disease performance without restriction [Normal] : affect appropriate [de-identified] : no puckering or edema or breast changes over the medial left breast, mild induration over the 7:00 L breast but difficult to palpate mass; no axillary LN palpable

## 2023-09-06 NOTE — CONSULT LETTER
[Dear  ___] : Dear  [unfilled], [Consult Letter:] : I had the pleasure of evaluating your patient, [unfilled]. [( Thank you for referring [unfilled] for consultation for _____ )] : Thank you for referring [unfilled] for consultation for [unfilled] [Please see my note below.] : Please see my note below. [Consult Closing:] : Thank you very much for allowing me to participate in the care of this patient.  If you have any questions, please do not hesitate to contact me. [Sincerely,] : Sincerely, [FreeTextEntry2] : [place] [address] [FreeTextEntry3] : Tripp Ragland MD Attending [facility] [DrKady  ___] : Dr. BENTON

## 2023-09-06 NOTE — HISTORY OF PRESENT ILLNESS
[de-identified] : Age 56: R RCC s/p partial nephrectomy with Dr Troy Age 63: left breast cancer Incidental finding from CT chest showing indeterminant 3 cm medial L breast mass along with indeterminate pulmonary nodules. She had mammogram/ sonogram done on 8/10/2023 which showed left breast 7:00 9 cm from the nipple correlating with mammographic mass measuring 4.4 x 2.4 x 3.6 cm.  She had left 7:00 u/s guided biopsy on 8/14/2023. 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 She had MRI of the breast done on 8/24/2023 which showed irregular heterogeneously enhancing mass containing a biopsy clip measuring 3.3 x 3.2 x 2.4 cm compatible with biopsy proven carcinoma, no significant axillary or internal mammary adenopathy, few foci of enhancement surrounding the dominant biopsy proven carcinoma suspicious for satellite lesions. She had MRI guided biopsy of the satellite lesions which showed benign breast parenchyma with stromal fibrosis, apocrine metaplasia and cystic changes.  [de-identified] : invasive mucinous carcinoma ER > 90%, MN 80%, Her2 negative [de-identified] : She is present to review neoadjuvant / adjuvant options. She is willing to have mastectomy or lumpectomy. Currently awaiting genetic testing given FH of cancer: those family members have passed and not sure what type of breast cancer cousin had at young age. She tolerated menopause well: no symptoms. She has baseline arthritis over the hands and knees. She denies any breast pain or chest wall changes. Works as a hairdresser.

## 2023-09-06 NOTE — REVIEW OF SYSTEMS
[Diarrhea: Grade 0] : Diarrhea: Grade 0 [Joint Pain] : joint pain [Joint Stiffness] : no joint stiffness [Muscle Pain] : no muscle pain [Muscle Weakness] : no muscle weakness [Suicidal] : not suicidal [Insomnia] : no insomnia [Anxiety] : anxiety [Depression] : no depression [Negative] : Allergic/Immunologic [FreeTextEntry9] : arthritis over hands and knees

## 2023-09-07 NOTE — HISTORY OF PRESENT ILLNESS
[de-identified] : 63-year-old lady.  She is being seen for an opinion regarding her recently diagnosed left breast cancer.  She already has an appointment scheduled with Dr. Devika Sanofrd, (to whom she was referred to by her internist).  Tyrer-Cuzick risk score = 8.1  Self-referred.  Recently diagnosed invasive mucinous cancer of her LEFT BREAST (7:00). T = >1.5 cm. + ER/KY. HER2 negative.  This is an asymptomatic nonpalpable finding identified in the medial left breast on CT scan of the chest performed for lung cancer screening..  It was further characterized by diagnostic mammography and sonography (below). Prior imaging for this was ~.   No previous personal history of breast disease. No prior breast biopsies.   + Prior personal history of malignancy: 2016 (she was 56 years old) right partial nephrectomy. No adjuvant therapy required. Continues to follow-up with her urologist: Dr. Praveen VERGARA.  No other personal history of malignancy.   + FH: Maternal cousin: Breast cancer. Paternal cousin also had breast cancer. Not sure if either one of them had genetic testing.  A maternal aunt may have had ovarian cancer, our patient is not certain.  Not Ashkenazi.  + Additional family history of malignancy: Brother: Lung cancer. A paternal uncle also had lung cancer.   Breast imagin2023: Bilateral mammogram and breast sonograms at 450: BI-RADS 4. Left breast (7:00): 9 cm FN indeterminate mass by ultrasound corresponding to the mammographic finding measuring ~4.4 x 2.4 x 3.6 cm.  2023: Left breast sonogram guided core needle biopsy provided the above diagnosis.  + COIL-SHAPED CLIP.  Menarche at 9.  1, para 1 at 36. Natural menopause at 48. No HRT   PMD: Dr. Gabe SERRATO.  NKDA.  No pacemaker or defibrillator. No anticoagulants  + Hypertension. Treated with amlodipine. She does not see a cardiologist  + History of right renal cell cancer. Right partial nephrectomy in . + Left renal angiolipoma. Urology: Dr. Praveen VERGARA  + Ex-smoker. Pulmonary: THERESE Herman with the [facility] system. 2023 CT scan okay x3 months. 2023 follow-up recommended, patient is aware ...........    She has not seen a gynecologist since age 38.   She has never had a colonoscopy

## 2023-09-07 NOTE — REVIEW OF SYSTEMS
[Negative] : Endocrine [FreeTextEntry5] : Hypertension [FreeTextEntry9] : Renal cell cancer [FreeTextEntry1] : Breast cancer

## 2023-09-07 NOTE — ASSESSMENT
[FreeTextEntry1] : 63-year-old lady.  Newly diagnosed ~4.4 cm invasive cancer of the lower inner quadrant of the left breast on sonogram guided core needle biopsy of an abnormality originally identified on CT scan of the chest.  + History of renal cell cancer. May 2023: CT scan of the abdomen and pelvis demonstrated no suspicious findings.  + Ex-smoker. 2023: CT chest demonstrated equivocal findings for which 3-month follow-up is due in 2023.   For further evaluation I recommended the followin.  Breast MRI. They understand and agree. Prescription entered. 2.  Genetic testing. They would like to pursue this avenue of investigation. Submitted today. 3.  Consideration of preoperative systemic therapy given the size of the tumor at presentation. Regarding this, they also understand and agree. Nurse navigators contacted to facilitate this appointment.  All the above was reviewed in detail, and all their questions were answered.  They will call me as a complete each step to update me.  They are in the process of seeking additional opinions.  Presently, no note dictated since she was self-referred.   2023. She and I spoke on the phone. Yesterday she had a breast MRI at 450: BI-RADS 4. 1.  Known malignancy in the medial left breast measures ~3.3 cm. 2.  + Additional, foci of enhancement surrounding the dominant biopsy-proven cancer. MRI core biopsy of an area 2.8 cm anterior to the dominant mass recommended.  She understands and agrees.  Prescription for the procedure entered today.   2023: Summary note: 2023, she had a left breast MRI core biopsy of an ipsilateral abnormality on the side of her recently diagnosed cancer. Pathology is benign and concordant (stromal fibrosis, apical metaplasia, and cystic change).  Yesterday () she met with medical oncology (Dr. Tripp Ragland). She recommended anastrozole for neoadjuvant treatment, in an effort to downsize the known malignancy to allow for a smaller breast conserving operation.  The patient is undecided about breast conservation versus mastectomy and would like to think about matters over the next few days.  She will call me when she has made a decision.  The results of her genetic testing are still pending, and I we will follow-up on those within the next 24 hours.

## 2023-09-07 NOTE — REASON FOR VISIT
[Initial Consultation] : an initial consultation for [Other: _____] : [unfilled] [FreeTextEntry2] : Recently diagnosed left breast cancer (~4.4 cm diameter on recent imaging)

## 2023-09-07 NOTE — PHYSICAL EXAM
[Normal] : supple, no neck mass and thyroid not enlarged [Normal Neck Lymph Nodes] : normal neck lymph nodes  [Normal Supraclavicular Lymph Nodes] : normal supraclavicular lymph nodes [Normal Axillary Lymph Nodes] : normal axillary lymph nodes [Normal] : normal appearance, no rash, nodules, vesicles, ulcers, erythema [de-identified] : Groins not examined [de-identified] : Below

## 2023-10-11 ENCOUNTER — NON-APPOINTMENT (OUTPATIENT)
Age: 63
End: 2023-10-11

## 2023-10-16 ENCOUNTER — RESULT REVIEW (OUTPATIENT)
Age: 63
End: 2023-10-16

## 2023-10-16 ENCOUNTER — OUTPATIENT (OUTPATIENT)
Dept: OUTPATIENT SERVICES | Facility: HOSPITAL | Age: 63
LOS: 1 days | End: 2023-10-16
Payer: MEDICARE

## 2023-10-16 ENCOUNTER — APPOINTMENT (OUTPATIENT)
Dept: HEMATOLOGY ONCOLOGY | Facility: CLINIC | Age: 63
End: 2023-10-16
Payer: MEDICARE

## 2023-10-16 ENCOUNTER — APPOINTMENT (OUTPATIENT)
Dept: ULTRASOUND IMAGING | Facility: IMAGING CENTER | Age: 63
End: 2023-10-16
Payer: MEDICARE

## 2023-10-16 VITALS
SYSTOLIC BLOOD PRESSURE: 153 MMHG | DIASTOLIC BLOOD PRESSURE: 85 MMHG | BODY MASS INDEX: 48.01 KG/M2 | HEART RATE: 78 BPM | TEMPERATURE: 98 F | RESPIRATION RATE: 16 BRPM | OXYGEN SATURATION: 97 % | WEIGHT: 279.52 LBS

## 2023-10-16 DIAGNOSIS — Z12.31 ENCOUNTER FOR SCREENING MAMMOGRAM FOR MALIGNANT NEOPLASM OF BREAST: ICD-10-CM

## 2023-10-16 DIAGNOSIS — D30.02 BENIGN NEOPLASM OF LEFT KIDNEY: Chronic | ICD-10-CM

## 2023-10-16 DIAGNOSIS — Z90.49 ACQUIRED ABSENCE OF OTHER SPECIFIED PARTS OF DIGESTIVE TRACT: Chronic | ICD-10-CM

## 2023-10-16 LAB
BASOPHILS # BLD AUTO: 0.05 K/UL — SIGNIFICANT CHANGE UP (ref 0–0.2)
BASOPHILS NFR BLD AUTO: 0.7 % — SIGNIFICANT CHANGE UP (ref 0–2)
EOSINOPHIL # BLD AUTO: 0.17 K/UL — SIGNIFICANT CHANGE UP (ref 0–0.5)
EOSINOPHIL NFR BLD AUTO: 2.5 % — SIGNIFICANT CHANGE UP (ref 0–6)
HCT VFR BLD CALC: 47.9 % — HIGH (ref 34.5–45)
HGB BLD-MCNC: 15 G/DL — SIGNIFICANT CHANGE UP (ref 11.5–15.5)
IMM GRANULOCYTES NFR BLD AUTO: 0.3 % — SIGNIFICANT CHANGE UP (ref 0–0.9)
LYMPHOCYTES # BLD AUTO: 1.88 K/UL — SIGNIFICANT CHANGE UP (ref 1–3.3)
LYMPHOCYTES # BLD AUTO: 28.2 % — SIGNIFICANT CHANGE UP (ref 13–44)
MCHC RBC-ENTMCNC: 25.3 PG — LOW (ref 27–34)
MCHC RBC-ENTMCNC: 31.3 G/DL — LOW (ref 32–36)
MCV RBC AUTO: 80.6 FL — SIGNIFICANT CHANGE UP (ref 80–100)
MONOCYTES # BLD AUTO: 0.44 K/UL — SIGNIFICANT CHANGE UP (ref 0–0.9)
MONOCYTES NFR BLD AUTO: 6.6 % — SIGNIFICANT CHANGE UP (ref 2–14)
NEUTROPHILS # BLD AUTO: 4.11 K/UL — SIGNIFICANT CHANGE UP (ref 1.8–7.4)
NEUTROPHILS NFR BLD AUTO: 61.7 % — SIGNIFICANT CHANGE UP (ref 43–77)
NRBC # BLD: 0 /100 WBCS — SIGNIFICANT CHANGE UP (ref 0–0)
PLATELET # BLD AUTO: 294 K/UL — SIGNIFICANT CHANGE UP (ref 150–400)
RBC # BLD: 5.94 M/UL — HIGH (ref 3.8–5.2)
RBC # FLD: 15.4 % — HIGH (ref 10.3–14.5)
WBC # BLD: 6.67 K/UL — SIGNIFICANT CHANGE UP (ref 3.8–10.5)
WBC # FLD AUTO: 6.67 K/UL — SIGNIFICANT CHANGE UP (ref 3.8–10.5)

## 2023-10-16 PROCEDURE — 76641 ULTRASOUND BREAST COMPLETE: CPT

## 2023-10-16 PROCEDURE — 76641 ULTRASOUND BREAST COMPLETE: CPT | Mod: 26,LT

## 2023-10-16 PROCEDURE — 99214 OFFICE O/P EST MOD 30 MIN: CPT

## 2023-10-17 LAB
ALBUMIN SERPL ELPH-MCNC: 4.7 G/DL
ALP BLD-CCNC: 103 U/L
ALT SERPL-CCNC: 15 U/L
ANION GAP SERPL CALC-SCNC: 17 MMOL/L
AST SERPL-CCNC: 14 U/L
BILIRUB SERPL-MCNC: 0.2 MG/DL
BUN SERPL-MCNC: 11 MG/DL
CALCIUM SERPL-MCNC: 9.7 MG/DL
CANCER AG27-29 SERPL-ACNC: 22.5 U/ML
CHLORIDE SERPL-SCNC: 106 MMOL/L
CO2 SERPL-SCNC: 20 MMOL/L
CREAT SERPL-MCNC: 0.69 MG/DL
EGFR: 96 ML/MIN/1.73M2
GLUCOSE SERPL-MCNC: 95 MG/DL
POTASSIUM SERPL-SCNC: 4.2 MMOL/L
PROT SERPL-MCNC: 7.6 G/DL
SODIUM SERPL-SCNC: 144 MMOL/L

## 2023-11-08 ENCOUNTER — APPOINTMENT (OUTPATIENT)
Dept: CT IMAGING | Facility: CLINIC | Age: 63
End: 2023-11-08
Payer: MEDICARE

## 2023-11-08 PROCEDURE — 71250 CT THORAX DX C-: CPT

## 2023-11-12 ENCOUNTER — NON-APPOINTMENT (OUTPATIENT)
Age: 63
End: 2023-11-12

## 2023-11-13 DIAGNOSIS — L30.8 OTHER SPECIFIED DERMATITIS: ICD-10-CM

## 2023-12-01 ENCOUNTER — OUTPATIENT (OUTPATIENT)
Dept: OUTPATIENT SERVICES | Facility: HOSPITAL | Age: 63
LOS: 1 days | Discharge: HOME | End: 2023-12-01

## 2023-12-01 DIAGNOSIS — D30.02 BENIGN NEOPLASM OF LEFT KIDNEY: Chronic | ICD-10-CM

## 2023-12-01 DIAGNOSIS — Z90.89 ACQUIRED ABSENCE OF OTHER ORGANS: Chronic | ICD-10-CM

## 2023-12-01 DIAGNOSIS — Z90.49 ACQUIRED ABSENCE OF OTHER SPECIFIED PARTS OF DIGESTIVE TRACT: Chronic | ICD-10-CM

## 2023-12-01 DIAGNOSIS — C44.191 OTHER SPECIFIED MALIGNANT NEOPLASM OF SKIN OF UNSPECIFIED EYELID, INCLUDING CANTHUS: ICD-10-CM

## 2023-12-18 ENCOUNTER — APPOINTMENT (OUTPATIENT)
Dept: SURGICAL ONCOLOGY | Facility: CLINIC | Age: 63
End: 2023-12-18
Payer: MEDICARE

## 2023-12-18 ENCOUNTER — RESULT REVIEW (OUTPATIENT)
Age: 63
End: 2023-12-18

## 2023-12-18 ENCOUNTER — APPOINTMENT (OUTPATIENT)
Dept: HEMATOLOGY ONCOLOGY | Facility: CLINIC | Age: 63
End: 2023-12-18
Payer: MEDICARE

## 2023-12-18 ENCOUNTER — APPOINTMENT (OUTPATIENT)
Dept: ULTRASOUND IMAGING | Facility: IMAGING CENTER | Age: 63
End: 2023-12-18
Payer: MEDICARE

## 2023-12-18 ENCOUNTER — OUTPATIENT (OUTPATIENT)
Dept: OUTPATIENT SERVICES | Facility: HOSPITAL | Age: 63
LOS: 1 days | End: 2023-12-18
Payer: MEDICARE

## 2023-12-18 VITALS
DIASTOLIC BLOOD PRESSURE: 86 MMHG | OXYGEN SATURATION: 97 % | BODY MASS INDEX: 48.85 KG/M2 | TEMPERATURE: 97.9 F | RESPIRATION RATE: 16 BRPM | HEART RATE: 83 BPM | SYSTOLIC BLOOD PRESSURE: 162 MMHG | WEIGHT: 284.39 LBS

## 2023-12-18 VITALS
SYSTOLIC BLOOD PRESSURE: 170 MMHG | OXYGEN SATURATION: 98 % | WEIGHT: 284 LBS | HEART RATE: 77 BPM | DIASTOLIC BLOOD PRESSURE: 92 MMHG | HEIGHT: 63 IN | BODY MASS INDEX: 50.32 KG/M2 | RESPIRATION RATE: 17 BRPM

## 2023-12-18 DIAGNOSIS — C50.312 MALIGNANT NEOPLASM OF LOWER-INNER QUADRANT OF LEFT FEMALE BREAST: ICD-10-CM

## 2023-12-18 DIAGNOSIS — Z90.49 ACQUIRED ABSENCE OF OTHER SPECIFIED PARTS OF DIGESTIVE TRACT: Chronic | ICD-10-CM

## 2023-12-18 DIAGNOSIS — Z12.31 ENCOUNTER FOR SCREENING MAMMOGRAM FOR MALIGNANT NEOPLASM OF BREAST: ICD-10-CM

## 2023-12-18 DIAGNOSIS — Z90.89 ACQUIRED ABSENCE OF OTHER ORGANS: Chronic | ICD-10-CM

## 2023-12-18 LAB
BASOPHILS # BLD AUTO: 0.04 K/UL — SIGNIFICANT CHANGE UP (ref 0–0.2)
BASOPHILS # BLD AUTO: 0.04 K/UL — SIGNIFICANT CHANGE UP (ref 0–0.2)
BASOPHILS NFR BLD AUTO: 0.6 % — SIGNIFICANT CHANGE UP (ref 0–2)
BASOPHILS NFR BLD AUTO: 0.6 % — SIGNIFICANT CHANGE UP (ref 0–2)
EOSINOPHIL # BLD AUTO: 0.12 K/UL — SIGNIFICANT CHANGE UP (ref 0–0.5)
EOSINOPHIL # BLD AUTO: 0.12 K/UL — SIGNIFICANT CHANGE UP (ref 0–0.5)
EOSINOPHIL NFR BLD AUTO: 1.9 % — SIGNIFICANT CHANGE UP (ref 0–6)
EOSINOPHIL NFR BLD AUTO: 1.9 % — SIGNIFICANT CHANGE UP (ref 0–6)
HCT VFR BLD CALC: 45.4 % — HIGH (ref 34.5–45)
HCT VFR BLD CALC: 45.4 % — HIGH (ref 34.5–45)
HGB BLD-MCNC: 14.6 G/DL — SIGNIFICANT CHANGE UP (ref 11.5–15.5)
HGB BLD-MCNC: 14.6 G/DL — SIGNIFICANT CHANGE UP (ref 11.5–15.5)
IMM GRANULOCYTES NFR BLD AUTO: 0.2 % — SIGNIFICANT CHANGE UP (ref 0–0.9)
IMM GRANULOCYTES NFR BLD AUTO: 0.2 % — SIGNIFICANT CHANGE UP (ref 0–0.9)
LYMPHOCYTES # BLD AUTO: 1.86 K/UL — SIGNIFICANT CHANGE UP (ref 1–3.3)
LYMPHOCYTES # BLD AUTO: 1.86 K/UL — SIGNIFICANT CHANGE UP (ref 1–3.3)
LYMPHOCYTES # BLD AUTO: 28.7 % — SIGNIFICANT CHANGE UP (ref 13–44)
LYMPHOCYTES # BLD AUTO: 28.7 % — SIGNIFICANT CHANGE UP (ref 13–44)
MCHC RBC-ENTMCNC: 25.9 PG — LOW (ref 27–34)
MCHC RBC-ENTMCNC: 25.9 PG — LOW (ref 27–34)
MCHC RBC-ENTMCNC: 32.2 G/DL — SIGNIFICANT CHANGE UP (ref 32–36)
MCHC RBC-ENTMCNC: 32.2 G/DL — SIGNIFICANT CHANGE UP (ref 32–36)
MCV RBC AUTO: 80.5 FL — SIGNIFICANT CHANGE UP (ref 80–100)
MCV RBC AUTO: 80.5 FL — SIGNIFICANT CHANGE UP (ref 80–100)
MONOCYTES # BLD AUTO: 0.38 K/UL — SIGNIFICANT CHANGE UP (ref 0–0.9)
MONOCYTES # BLD AUTO: 0.38 K/UL — SIGNIFICANT CHANGE UP (ref 0–0.9)
MONOCYTES NFR BLD AUTO: 5.9 % — SIGNIFICANT CHANGE UP (ref 2–14)
MONOCYTES NFR BLD AUTO: 5.9 % — SIGNIFICANT CHANGE UP (ref 2–14)
NEUTROPHILS # BLD AUTO: 4.06 K/UL — SIGNIFICANT CHANGE UP (ref 1.8–7.4)
NEUTROPHILS # BLD AUTO: 4.06 K/UL — SIGNIFICANT CHANGE UP (ref 1.8–7.4)
NEUTROPHILS NFR BLD AUTO: 62.7 % — SIGNIFICANT CHANGE UP (ref 43–77)
NEUTROPHILS NFR BLD AUTO: 62.7 % — SIGNIFICANT CHANGE UP (ref 43–77)
NRBC # BLD: 0 /100 WBCS — SIGNIFICANT CHANGE UP (ref 0–0)
NRBC # BLD: 0 /100 WBCS — SIGNIFICANT CHANGE UP (ref 0–0)
PLATELET # BLD AUTO: 279 K/UL — SIGNIFICANT CHANGE UP (ref 150–400)
PLATELET # BLD AUTO: 279 K/UL — SIGNIFICANT CHANGE UP (ref 150–400)
RBC # BLD: 5.64 M/UL — HIGH (ref 3.8–5.2)
RBC # BLD: 5.64 M/UL — HIGH (ref 3.8–5.2)
RBC # FLD: 14.6 % — HIGH (ref 10.3–14.5)
RBC # FLD: 14.6 % — HIGH (ref 10.3–14.5)
WBC # BLD: 6.47 K/UL — SIGNIFICANT CHANGE UP (ref 3.8–10.5)
WBC # BLD: 6.47 K/UL — SIGNIFICANT CHANGE UP (ref 3.8–10.5)
WBC # FLD AUTO: 6.47 K/UL — SIGNIFICANT CHANGE UP (ref 3.8–10.5)
WBC # FLD AUTO: 6.47 K/UL — SIGNIFICANT CHANGE UP (ref 3.8–10.5)

## 2023-12-18 PROCEDURE — 99215 OFFICE O/P EST HI 40 MIN: CPT

## 2023-12-18 PROCEDURE — 76641 ULTRASOUND BREAST COMPLETE: CPT | Mod: 26,LT

## 2023-12-18 PROCEDURE — 76641 ULTRASOUND BREAST COMPLETE: CPT

## 2023-12-18 PROCEDURE — 99417 PROLNG OP E/M EACH 15 MIN: CPT

## 2023-12-18 PROCEDURE — 99213 OFFICE O/P EST LOW 20 MIN: CPT

## 2023-12-21 LAB
ALBUMIN SERPL ELPH-MCNC: 4.9 G/DL
ALP BLD-CCNC: 103 U/L
ALT SERPL-CCNC: 18 U/L
ANION GAP SERPL CALC-SCNC: 11 MMOL/L
AST SERPL-CCNC: 16 U/L
BILIRUB SERPL-MCNC: 0.4 MG/DL
BUN SERPL-MCNC: 15 MG/DL
CALCIUM SERPL-MCNC: 9.9 MG/DL
CANCER AG27-29 SERPL-ACNC: 22.6 U/ML
CHLORIDE SERPL-SCNC: 103 MMOL/L
CO2 SERPL-SCNC: 26 MMOL/L
CREAT SERPL-MCNC: 0.73 MG/DL
EGFR: 91 ML/MIN/1.73M2
GLUCOSE SERPL-MCNC: 107 MG/DL
POTASSIUM SERPL-SCNC: 4.4 MMOL/L
PROT SERPL-MCNC: 7.9 G/DL
SODIUM SERPL-SCNC: 140 MMOL/L

## 2023-12-23 NOTE — REVIEW OF SYSTEMS
[Diarrhea: Grade 0] : Diarrhea: Grade 0 [Skin Rash] : no skin rash [Skin Wound] : no skin wound [Negative] : Allergic/Immunologic [de-identified] : breast mass that is stable and wondering when she can have removed

## 2023-12-23 NOTE — ASSESSMENT
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 She started on endocrine neoadjuvant therapy on 9/2023 with anastrozole. She had interval us showing stable breast mass. She will be seeing Dr Lopez for surgical options: has been on neoadjuvant therapy for 3 months and reviewed expectations for breast mass if this has been stable for the last 3 months. She understands she will stay on anastrozole after breast cancer surgery. Questions answered to her satisfaction. She is agreeable with plan. Next follow up in 4 months but earlier if any new symptoms.

## 2023-12-23 NOTE — HISTORY OF PRESENT ILLNESS
[Disease: _____________________] : Disease: [unfilled] [T: ___] : T[unfilled] [de-identified] : Age 56: R RCC s/p partial nephrectomy with Dr Troy Age 63: left breast cancer Incidental finding from CT chest showing indeterminant 3 cm medial L breast mass along with indeterminate pulmonary nodules. She had mammogram/ sonogram done on 8/10/2023 which showed left breast 7:00 9 cm from the nipple correlating with mammographic mass measuring 4.4 x 2.4 x 3.6 cm.  She had left 7:00 u/s guided biopsy on 8/14/2023. 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 She had MRI of the breast done on 8/24/2023 which showed irregular heterogeneously enhancing mass containing a biopsy clip measuring 3.3 x 3.2 x 2.4 cm compatible with biopsy proven carcinoma, no significant axillary or internal mammary adenopathy, few foci of enhancement surrounding the dominant biopsy proven carcinoma suspicious for satellite lesions. She had MRI guided biopsy of the satellite lesions which showed benign breast parenchyma with stromal fibrosis, apocrine metaplasia and cystic changes. She started on anastrozole 9/2023 as neoadjuvant therapy.  [de-identified] : invasive mucinous carcinoma ER > 90%, ME 80%, Her2 negative [de-identified] : anastrozole 9/2023 to present  [de-identified] : Has been on anastrozole and continues to tolerate. She had us showing breast mass unchanged. She will be seeing Dr Lopez regarding surgical options. She denies any new cough or back pain or HA. She continues to work as  without any affect from the estrogen blockade.

## 2023-12-23 NOTE — PHYSICAL EXAM
[Fully active, able to carry on all pre-disease performance without restriction] : Status 0 - Fully active, able to carry on all pre-disease performance without restriction [Normal] : affect appropriate [de-identified] : no puckering or edema or breast changes over the medial left breast, mild induration over the 7:00 L breast but difficult to palpate mass; no axillary LN palpable

## 2024-01-03 ENCOUNTER — APPOINTMENT (OUTPATIENT)
Dept: PLASTIC SURGERY | Facility: CLINIC | Age: 64
End: 2024-01-03
Payer: MEDICARE

## 2024-01-03 VITALS
HEART RATE: 77 BPM | HEIGHT: 63 IN | BODY MASS INDEX: 50.32 KG/M2 | SYSTOLIC BLOOD PRESSURE: 126 MMHG | DIASTOLIC BLOOD PRESSURE: 82 MMHG | WEIGHT: 284 LBS | TEMPERATURE: 98 F | OXYGEN SATURATION: 99 %

## 2024-01-03 PROCEDURE — 99203 OFFICE O/P NEW LOW 30 MIN: CPT

## 2024-01-08 ENCOUNTER — OUTPATIENT (OUTPATIENT)
Dept: OUTPATIENT SERVICES | Facility: HOSPITAL | Age: 64
LOS: 1 days | End: 2024-01-08
Payer: MEDICARE

## 2024-01-08 ENCOUNTER — RESULT REVIEW (OUTPATIENT)
Age: 64
End: 2024-01-08

## 2024-01-08 VITALS
DIASTOLIC BLOOD PRESSURE: 82 MMHG | OXYGEN SATURATION: 96 % | HEIGHT: 64 IN | HEART RATE: 75 BPM | WEIGHT: 286.6 LBS | SYSTOLIC BLOOD PRESSURE: 138 MMHG | TEMPERATURE: 98 F | RESPIRATION RATE: 16 BRPM

## 2024-01-08 DIAGNOSIS — C50.312 MALIGNANT NEOPLASM OF LOWER-INNER QUADRANT OF LEFT FEMALE BREAST: ICD-10-CM

## 2024-01-08 DIAGNOSIS — Z90.49 ACQUIRED ABSENCE OF OTHER SPECIFIED PARTS OF DIGESTIVE TRACT: Chronic | ICD-10-CM

## 2024-01-08 DIAGNOSIS — Z01.818 ENCOUNTER FOR OTHER PREPROCEDURAL EXAMINATION: ICD-10-CM

## 2024-01-08 DIAGNOSIS — I10 ESSENTIAL (PRIMARY) HYPERTENSION: ICD-10-CM

## 2024-01-08 DIAGNOSIS — D30.02 BENIGN NEOPLASM OF LEFT KIDNEY: Chronic | ICD-10-CM

## 2024-01-08 DIAGNOSIS — Z90.89 ACQUIRED ABSENCE OF OTHER ORGANS: Chronic | ICD-10-CM

## 2024-01-08 PROCEDURE — G0463: CPT

## 2024-01-08 NOTE — H&P PST ADULT - NSICDXFAMILYHX_GEN_ALL_CORE_FT
FAMILY HISTORY:  Father  Still living? No  Family history of heart attack, Age at diagnosis: Age Unknown  Family history of lung cancer, Age at diagnosis: Age Unknown    Mother  Still living? Unknown  Family history of Any Gehrig's disease, Age at diagnosis: Age Unknown    Sibling  Still living? Unknown  Family history of lung cancer, Age at diagnosis: Age Unknown

## 2024-01-08 NOTE — H&P PST ADULT - NSICDXPASTMEDICALHX_GEN_ALL_CORE_FT
PAST MEDICAL HISTORY:  Anxiety     Hypertension     Malignant neoplasm of lower-inner quadrant of left female breast     Morbidly obese     Renal angiomyolipoma     Renal mass, right

## 2024-01-08 NOTE — H&P PST ADULT - HISTORY OF PRESENT ILLNESS
This is a 64 y/o female with PMHX, HTN, renal carcinoma (s/p partial nephrectomy in 2016) presents to PST with pre-operative diagnosis of left breast cancer.  Lesion noted on lung CT in 9/2023.  Biopsy confirmed malignancy.  Anastrazole x 3 months.  Pt denies any breast pain, palpable masses or nipple discharge b/l.  Otherwise pt feels well today and denies any acute symptoms. This is a 62 y/o female with PMHX, HTN, renal carcinoma (s/p partial nephrectomy in 2016) presents to PST with pre-operative diagnosis of left breast cancer.  Lesion noted on lung CT in 9/2023.  Biopsy confirmed malignancy.  Anastrazole x 3 months.  Pt denies any breast pain, palpable masses or nipple discharge b/l.  Otherwise pt feels well today and denies any acute symptoms.

## 2024-01-08 NOTE — H&P PST ADULT - NSICDXPASTSURGICALHX_GEN_ALL_CORE_FT
PAST SURGICAL HISTORY:  Angiomyolipoma of left kidney ablation May 2016    History of tonsillectomy 1960    S/P cholecystectomy

## 2024-01-08 NOTE — H&P PST ADULT - ATTENDING COMMENTS
Next he 3-year-old lady with presented with a  large diameter left breast cancer.  Her extent of disease evaluation was unremarkable.  She has had a partial clinical response to neoadjuvant systemic therapy.    She is scheduled for wide excision with preoperative localization, oncoplastic closure by Dr. Bill Vásquez, and sentinel node biopsy.    oncologic diagnosis, surgical approach, risks, benefits and possible surgical outcomes reviewed in detail, all questions answered.    Consent on chart 63-year-old lady  who presented with a  large diameter left breast cancer.  Her extent of disease evaluation was unremarkable.  She has had a partial clinical response to neoadjuvant systemic therapy.    She is scheduled for wide excision with preoperative localization, oncoplastic closure by Dr. Bill Vásquez, and sentinel node biopsy.    oncologic diagnosis, surgical approach, risks, benefits and possible surgical outcomes reviewed in detail, all questions answered.    Consent on chart

## 2024-01-09 ENCOUNTER — APPOINTMENT (OUTPATIENT)
Dept: INTERNAL MEDICINE | Facility: CLINIC | Age: 64
End: 2024-01-09
Payer: MEDICARE

## 2024-01-09 ENCOUNTER — NON-APPOINTMENT (OUTPATIENT)
Age: 64
End: 2024-01-09

## 2024-01-09 VITALS
DIASTOLIC BLOOD PRESSURE: 80 MMHG | HEIGHT: 63 IN | WEIGHT: 276 LBS | RESPIRATION RATE: 17 BRPM | HEART RATE: 78 BPM | OXYGEN SATURATION: 98 % | BODY MASS INDEX: 48.9 KG/M2 | TEMPERATURE: 98.5 F | SYSTOLIC BLOOD PRESSURE: 130 MMHG

## 2024-01-09 DIAGNOSIS — C50.919 MALIGNANT NEOPLASM OF UNSPECIFIED SITE OF UNSPECIFIED FEMALE BREAST: ICD-10-CM

## 2024-01-09 DIAGNOSIS — I10 ESSENTIAL (PRIMARY) HYPERTENSION: ICD-10-CM

## 2024-01-09 DIAGNOSIS — N63.24 UNSPECIFIED LUMP IN THE LEFT BREAST, LOWER INNER QUADRANT: ICD-10-CM

## 2024-01-09 DIAGNOSIS — R63.8 OTHER SYMPTOMS AND SIGNS CONCERNING FOOD AND FLUID INTAKE: ICD-10-CM

## 2024-01-09 DIAGNOSIS — R73.03 PREDIABETES.: ICD-10-CM

## 2024-01-09 DIAGNOSIS — Z01.818 ENCOUNTER FOR OTHER PREPROCEDURAL EXAMINATION: ICD-10-CM

## 2024-01-09 LAB
HBA1C MFR BLD HPLC: 6.1
SURGICAL PATHOLOGY STUDY: SIGNIFICANT CHANGE UP
SURGICAL PATHOLOGY STUDY: SIGNIFICANT CHANGE UP

## 2024-01-09 PROCEDURE — G2211 COMPLEX E/M VISIT ADD ON: CPT

## 2024-01-09 PROCEDURE — 83036 HEMOGLOBIN GLYCOSYLATED A1C: CPT | Mod: QW

## 2024-01-09 PROCEDURE — 99214 OFFICE O/P EST MOD 30 MIN: CPT

## 2024-01-13 RX ORDER — NYSTATIN 100000 U/G
100000 OINTMENT TOPICAL
Qty: 1 | Refills: 1 | Status: ACTIVE | COMMUNITY
Start: 2023-11-13 | End: 1900-01-01

## 2024-01-13 RX ORDER — NYSTATIN 100000 1/G
100000 POWDER TOPICAL 3 TIMES DAILY
Qty: 1 | Refills: 1 | Status: ACTIVE | COMMUNITY
Start: 2023-11-13 | End: 1900-01-01

## 2024-01-16 ENCOUNTER — TRANSCRIPTION ENCOUNTER (OUTPATIENT)
Age: 64
End: 2024-01-16

## 2024-01-16 ENCOUNTER — APPOINTMENT (OUTPATIENT)
Dept: ULTRASOUND IMAGING | Facility: CLINIC | Age: 64
End: 2024-01-16

## 2024-01-16 ENCOUNTER — APPOINTMENT (OUTPATIENT)
Dept: INTERNAL MEDICINE | Facility: CLINIC | Age: 64
End: 2024-01-16

## 2024-01-17 ENCOUNTER — RESULT REVIEW (OUTPATIENT)
Age: 64
End: 2024-01-17

## 2024-01-17 ENCOUNTER — TRANSCRIPTION ENCOUNTER (OUTPATIENT)
Age: 64
End: 2024-01-17

## 2024-01-17 ENCOUNTER — APPOINTMENT (OUTPATIENT)
Dept: PLASTIC SURGERY | Facility: HOSPITAL | Age: 64
End: 2024-01-17
Payer: MEDICARE

## 2024-01-17 ENCOUNTER — APPOINTMENT (OUTPATIENT)
Dept: SURGICAL ONCOLOGY | Facility: HOSPITAL | Age: 64
End: 2024-01-17

## 2024-01-17 ENCOUNTER — OUTPATIENT (OUTPATIENT)
Dept: INPATIENT UNIT | Facility: HOSPITAL | Age: 64
LOS: 1 days | End: 2024-01-17
Payer: MEDICARE

## 2024-01-17 VITALS
HEART RATE: 74 BPM | HEIGHT: 64 IN | TEMPERATURE: 98 F | DIASTOLIC BLOOD PRESSURE: 93 MMHG | OXYGEN SATURATION: 97 % | WEIGHT: 286.6 LBS | RESPIRATION RATE: 15 BRPM | SYSTOLIC BLOOD PRESSURE: 151 MMHG

## 2024-01-17 VITALS
SYSTOLIC BLOOD PRESSURE: 130 MMHG | HEART RATE: 79 BPM | RESPIRATION RATE: 16 BRPM | DIASTOLIC BLOOD PRESSURE: 82 MMHG | OXYGEN SATURATION: 92 % | TEMPERATURE: 98 F

## 2024-01-17 DIAGNOSIS — Z90.49 ACQUIRED ABSENCE OF OTHER SPECIFIED PARTS OF DIGESTIVE TRACT: Chronic | ICD-10-CM

## 2024-01-17 DIAGNOSIS — C50.312 MALIGNANT NEOPLASM OF LOWER-INNER QUADRANT OF LEFT FEMALE BREAST: ICD-10-CM

## 2024-01-17 DIAGNOSIS — Z90.89 ACQUIRED ABSENCE OF OTHER ORGANS: Chronic | ICD-10-CM

## 2024-01-17 DIAGNOSIS — D30.02 BENIGN NEOPLASM OF LEFT KIDNEY: Chronic | ICD-10-CM

## 2024-01-17 PROCEDURE — 88307 TISSUE EXAM BY PATHOLOGIST: CPT | Mod: 26

## 2024-01-17 PROCEDURE — 19285 PERQ DEV BREAST 1ST US IMAG: CPT

## 2024-01-17 PROCEDURE — 76098 X-RAY EXAM SURGICAL SPECIMEN: CPT | Mod: 26

## 2024-01-17 PROCEDURE — 19301 PARTIAL MASTECTOMY: CPT | Mod: LT

## 2024-01-17 PROCEDURE — 88307 TISSUE EXAM BY PATHOLOGIST: CPT

## 2024-01-17 PROCEDURE — 76999 ECHO EXAMINATION PROCEDURE: CPT | Mod: 26

## 2024-01-17 PROCEDURE — 14301 TIS TRNFR ANY 30.1-60 SQ CM: CPT

## 2024-01-17 PROCEDURE — 76999 ECHO EXAMINATION PROCEDURE: CPT

## 2024-01-17 PROCEDURE — C9399: CPT

## 2024-01-17 PROCEDURE — 19285 PERQ DEV BREAST 1ST US IMAG: CPT | Mod: LT

## 2024-01-17 PROCEDURE — 19125 EXCISION BREAST LESION: CPT | Mod: LT

## 2024-01-17 PROCEDURE — 76098 X-RAY EXAM SURGICAL SPECIMEN: CPT

## 2024-01-17 PROCEDURE — 38792 RA TRACER ID OF SENTINL NODE: CPT

## 2024-01-17 PROCEDURE — 38525 BIOPSY/REMOVAL LYMPH NODES: CPT

## 2024-01-17 PROCEDURE — A9541: CPT

## 2024-01-17 RX ORDER — ANASTROZOLE 1 MG/1
1 TABLET ORAL
Refills: 0 | DISCHARGE

## 2024-01-17 RX ORDER — FLUCONAZOLE 150 MG/1
1 TABLET ORAL
Qty: 3 | Refills: 0
Start: 2024-01-17 | End: 2024-01-19

## 2024-01-17 RX ORDER — ONDANSETRON 8 MG/1
4 TABLET, FILM COATED ORAL ONCE
Refills: 0 | Status: DISCONTINUED | OUTPATIENT
Start: 2024-01-17 | End: 2024-01-17

## 2024-01-17 RX ORDER — HEPARIN SODIUM 5000 [USP'U]/ML
5000 INJECTION INTRAVENOUS; SUBCUTANEOUS ONCE
Refills: 0 | Status: COMPLETED | OUTPATIENT
Start: 2024-01-17 | End: 2024-01-17

## 2024-01-17 RX ORDER — HYDROMORPHONE HYDROCHLORIDE 2 MG/ML
0.2 INJECTION INTRAMUSCULAR; INTRAVENOUS; SUBCUTANEOUS
Refills: 0 | Status: DISCONTINUED | OUTPATIENT
Start: 2024-01-17 | End: 2024-01-17

## 2024-01-17 RX ORDER — SODIUM CHLORIDE 9 MG/ML
1000 INJECTION, SOLUTION INTRAVENOUS
Refills: 0 | Status: DISCONTINUED | OUTPATIENT
Start: 2024-01-17 | End: 2024-01-17

## 2024-01-17 RX ORDER — HYDROMORPHONE HYDROCHLORIDE 2 MG/ML
0.5 INJECTION INTRAMUSCULAR; INTRAVENOUS; SUBCUTANEOUS
Refills: 0 | Status: DISCONTINUED | OUTPATIENT
Start: 2024-01-17 | End: 2024-01-17

## 2024-01-17 RX ORDER — OXYCODONE HYDROCHLORIDE 5 MG/1
1 TABLET ORAL
Qty: 6 | Refills: 0
Start: 2024-01-17

## 2024-01-17 RX ORDER — NYSTATIN CREAM 100000 [USP'U]/G
1 CREAM TOPICAL
Qty: 4 | Refills: 0
Start: 2024-01-17 | End: 2024-01-30

## 2024-01-17 RX ADMIN — HEPARIN SODIUM 5000 UNIT(S): 5000 INJECTION INTRAVENOUS; SUBCUTANEOUS at 10:21

## 2024-01-17 RX ADMIN — SODIUM CHLORIDE 50 MILLILITER(S): 9 INJECTION, SOLUTION INTRAVENOUS at 08:07

## 2024-01-17 NOTE — BRIEF OPERATIVE NOTE - NSICDXBRIEFPROCEDURE_GEN_ALL_CORE_FT
PROCEDURES:  Wide local excision, tissue, breast 17-Jan-2024 12:41:43 L breast with L axillary sentinel lymph node biopsy Earl Serrano  
PROCEDURES:  Adjacent tissue transfer of chest wall, defect size 15.1 sq cm to 30 sq cm 17-Jan-2024 13:15:21  [employer]

## 2024-01-17 NOTE — ASU DISCHARGE PLAN (ADULT/PEDIATRIC) - NS MD DC FALL RISK RISK
For information on Fall & Injury Prevention, visit: [email or web address] OR  [email or web address] OR  https://www.cdc.gov/steadi/patient.html

## 2024-01-17 NOTE — BRIEF OPERATIVE NOTE - NSICDXBRIEFPREOP_GEN_ALL_CORE_FT
PRE-OP DIAGNOSIS:  Breast cancer 17-Jan-2024 12:46:00 Left breast Earl Serrano  
PRE-OP DIAGNOSIS:  Breast cancer 17-Jan-2024 12:46:00 Left breast Earl Serrano

## 2024-01-17 NOTE — ASU DISCHARGE PLAN (ADULT/PEDIATRIC) - PROCEDURE
Lumpectomy for left breast cancer with lymph node biopsy and plastics closures Lumpectomy for left breast cancer with lymph node biopsy and plastic surgery closures

## 2024-01-17 NOTE — ASU DISCHARGE PLAN (ADULT/PEDIATRIC) - ASU DC SPECIAL INSTRUCTIONSFT
Initial followup with plastic surgery in the next 5-15 days, regarding matters of bandages, activities, and showering.    Dr. Lopez should call with pathology report in approximately 2 weeks.  The conversation will determine further management. Initial followup with plastic surgery in the next 5-15 days, regarding matters of bandages, activities, and showering.    You may shower in 48 hours but please do not scrub or rub directly over the operative site and pat to dry.     Dr. Lopez should call with pathology report in approximately 2 weeks.  The conversation will determine further management.

## 2024-01-17 NOTE — ASU DISCHARGE PLAN (ADULT/PEDIATRIC) - CARE PROVIDER_API CALL
Ashok Aceves)  Plastic Surgery  [address], [address]  [place] [address]  Phone: [phone]  Fax: [phone]  Follow Up Time: 1 week

## 2024-01-17 NOTE — BRIEF OPERATIVE NOTE - OPERATION/FINDINGS
L lumpectomy and SLNB by Dr Lopez.  Closure of SLNB site and oncoplastic closure of lumpectomy defect.
See full operative report for details

## 2024-01-17 NOTE — BRIEF OPERATIVE NOTE - NSICDXBRIEFPOSTOP_GEN_ALL_CORE_FT
POST-OP DIAGNOSIS:  Breast cancer 17-Jan-2024 12:46:17 Left breast Earl Serrano  
POST-OP DIAGNOSIS:  Breast cancer 17-Jan-2024 12:46:17 Left breast Earl Serrano

## 2024-01-17 NOTE — ASU PATIENT PROFILE, ADULT - NS TRANSFER EYEGLASSES PAIRS
1. Have you been to the ER, urgent care clinic since your last visit? Yes/[facility]/abdominal pain Hospitalized since your last visit? Yes 
 
2. Have you seen or consulted any other health care providers outside of the [facility] since your last visit? Yes Wt Readings from Last 3 Encounters:  
09/13/18 176 lb 4.8 oz (80 kg) 03/13/18 167 lb 14.4 oz (76.2 kg) 09/12/17 159 lb (72.1 kg) Temp Readings from Last 3 Encounters:  
09/13/18 97.9 °F (36.6 °C) (Oral) 03/13/18 97.9 °F (36.6 °C) (Oral) 09/12/17 97.6 °F (36.4 °C) (Oral) BP Readings from Last 3 Encounters:  
09/13/18 (!) 84/47  
03/13/18 176/76  
09/12/17 140/64 Pulse Readings from Last 3 Encounters:  
09/13/18 80  
03/13/18 72  
09/12/17 71 Lab Results Component Value Date/Time Hemoglobin A1c 6.6 (H) 09/06/2018 10:10 AM  
 Hemoglobin A1c (POC) 8.5 03/13/2018 10:25 AM  
 Hemoglobin A1c, External 6.6 05/06/2016 No meter or logbook today.  
Patient will schedule eye exam. 
 1 pair

## 2024-01-17 NOTE — ASU PATIENT PROFILE, ADULT - VISION (WITH CORRECTIVE LENSES IF THE PATIENT USUALLY WEARS THEM):
with glasses otherwise vision is impaired/Normal vision: sees adequately in most situations; can see medication labels, newsprint

## 2024-01-23 LAB — SURGICAL PATHOLOGY STUDY: SIGNIFICANT CHANGE UP

## 2024-01-25 ENCOUNTER — APPOINTMENT (OUTPATIENT)
Dept: PLASTIC SURGERY | Facility: CLINIC | Age: 64
End: 2024-01-25
Payer: MEDICARE

## 2024-01-25 PROBLEM — C50.312 MALIGNANT NEOPLASM OF LOWER-INNER QUADRANT OF LEFT FEMALE BREAST: Chronic | Status: ACTIVE | Noted: 2024-01-08

## 2024-01-25 PROCEDURE — 99024 POSTOP FOLLOW-UP VISIT: CPT

## 2024-01-25 NOTE — REASON FOR VISIT
[Follow-Up Visit] : a follow-up visit for [Other: _____] : [unfilled] [FreeTextEntry2] : Left breast lower inner quadrant cancer that has been treated with 3 months of neoadjuvant antiestrogen therapy

## 2024-01-25 NOTE — PHYSICAL EXAM
[Normal] : supple, no neck mass and thyroid not enlarged [Normal Neck Lymph Nodes] : normal neck lymph nodes  [Normal Supraclavicular Lymph Nodes] : normal supraclavicular lymph nodes [Normal Axillary Lymph Nodes] : normal axillary lymph nodes [Normal] : normal appearance, no rash, nodules, vesicles, ulcers, erythema [de-identified] : Groins not examined [de-identified] : Below

## 2024-01-25 NOTE — REVIEW OF SYSTEMS
[Negative] : Heme/Lymph [FreeTextEntry5] : Hypertension [FreeTextEntry6] : Ex-smoker [FreeTextEntry1] : Breast cancer [FreeTextEntry9] : History of kidney cancer

## 2024-01-25 NOTE — ASSESSMENT
[FreeTextEntry1] : 63-year-old lady with a history of renal cell cancer who was seen August 2023 with a newly diagnosed left breast cancer measuring ~4.4 cm on sonography.  Her extent of disease evaluation was unremarkable.  September 2023, she started preoperative antiestrogen treatment with anastrozole through Dr. Tripp Ragland.  Presents for interval reassessment.  Sonographically (earlier today) the mass is not significantly changed.  She understands the options of breast conservation therapy versus mastectomy, and the former is her preference. She understands the indications and technique for sentinel node mapping and biopsy which will be included as part of her index operation. Due to the size of the tumor, and the anticipated volume loss from lumpectomy, we will coordinate with plastic surgery.  Oncologic concerns, operative approach, risk, benefits, alternatives, possible surgical outcomes reviewed in detail, all questions answered.  Paperwork for surgical scheduling submitted.   1/25/2024. She and I spoke on the phone. January 17, 2024, she had left breast conserving surgery for a hormone receptor sensitive breast cancer therapy and treated with neoadjuvant anastrozole since September 2023. Oncoplastic closure: Dr. Ashok Aceves. Surgical pathology: T = 3.9 cm, negative margins. 0/1 left axillary SLN.  Note dictated to her physicians. Navigators contacted to arrange for radiation therapy consultation and medical oncology follow-up. Recommended 6-month follow-up with me, sooner if needed.

## 2024-01-25 NOTE — HISTORY OF PRESENT ILLNESS
[de-identified] : 63-year-old lady.  2023: Referred with a ~4.4 cm invasive cancer of her LEFT BREAST (LIQ).  2023: Preoperative breast MRI: BI-RADS 4. + Additional ipsilateral (left breast) anterior to the known malignancy. Subsequent MRI guided core biopsy was benign and concordant.  2023: Initiated neoadjuvant systemic therapy with anastrozole. Medical oncology: Dr. Tripp GAGNON.   Genetic testing ([employer]) 84 gene assay: Negative 2023: VUS in RET.   Remainder of her extent of disease evaluation: May 2023: CT abdomen and pelvis was unremarkable. 2023 CT chest: Equivocal findings. 2023: Follow-up CT chest: No interval changes. 6-month follow-up recommended for 2024. Prescription entered today.   CC: No signs or symptoms related to either breast.   2023: She was seen for an opinion regarding her recently diagnosed left breast cancer.  She already had an appointment scheduled with Dr. Devika Sanford, (to whom she was referred to by her internist).  Tyrer-Cuzick risk score = 8.1  Self-referred.  Recently diagnosed invasive mucinous cancer of her LEFT BREAST (7:00). T = >1.5 cm. + ER/TN. HER2 negative.  This was an asymptomatic nonpalpable finding identified in the medial left breast on CT scan of the chest performed for lung cancer screening.  It was further characterized by diagnostic mammography and sonography (below). Prior imaging for this was ~.   No previous personal history of breast disease. No prior breast biopsies.   + Prior personal history of malignancy: 2016 (she was 56 years old) right partial nephrectomy for renal cell carcinoma (RCCA). No adjuvant therapy required. Continues to follow-up with her urologist: Dr. Praveen VERGARA.  No other personal history of malignancy.   + FH: Maternal cousin: Breast cancer. Paternal cousin also had breast cancer. Not sure if either one of them had genetic testing.  A maternal aunt may have had ovarian cancer, our patient is not certain.  Not Ashkenazi.  + Additional family history of malignancy: Brother: Lung cancer. A paternal uncle also had lung cancer.   Breast imagin2023: Bilateral mammogram and breast sonograms at 450: BI-RADS 4. Left breast (7:00): 9 cm FN indeterminate mass by ultrasound corresponding to the mammographic finding measuring ~4.4 x 2.4 x 3.6 cm.  2023: Left breast sonogram guided core needle biopsy provided the above diagnosis.  + COIL-SHAPED CLIP.  Menarche at 9.  1, para 1 at 36. Natural menopause at 48. No HRT   PMD: Dr. Gabe SERRATO.  NKDA.  No pacemaker or defibrillator. No anticoagulants  + Hypertension. Treated with amlodipine. She does not see a cardiologist.  + History of right renal cell cancer. Right partial nephrectomy in . + Left renal angiolipoma. Urology: Dr. Praveen VERGARA  + Ex-smoker. Pulmonary: THERESE Herman with the [employer]. 2023 CT scan okay x3 months. 2023: 3-month follow-up, no changes. 6-month follow-up recommended for 2024.   She has not seen a gynecologist since age 38.   She has never had a colonoscopy

## 2024-01-26 ENCOUNTER — OUTPATIENT (OUTPATIENT)
Dept: OUTPATIENT SERVICES | Facility: HOSPITAL | Age: 64
LOS: 1 days | Discharge: HOME | End: 2024-01-26
Payer: MEDICARE

## 2024-01-26 ENCOUNTER — NON-APPOINTMENT (OUTPATIENT)
Age: 64
End: 2024-01-26

## 2024-01-26 DIAGNOSIS — D30.02 BENIGN NEOPLASM OF LEFT KIDNEY: Chronic | ICD-10-CM

## 2024-01-26 DIAGNOSIS — Z90.49 ACQUIRED ABSENCE OF OTHER SPECIFIED PARTS OF DIGESTIVE TRACT: Chronic | ICD-10-CM

## 2024-01-26 DIAGNOSIS — Z90.89 ACQUIRED ABSENCE OF OTHER ORGANS: Chronic | ICD-10-CM

## 2024-01-26 NOTE — HISTORY OF PRESENT ILLNESS
[FreeTextEntry1] : Pt s/p left breast lumpectomy with closure and left sentinel lymph node biopsy.  Pt doing well no complaints.  Happy with the results

## 2024-01-26 NOTE — ASU PATIENT PROFILE, ADULT - NS PRO MODE OF ARRIVAL
Call placed to [facility].    VM left for Dee to call back to get update on pt status.   ambulatory

## 2024-01-26 NOTE — REASON FOR VISIT
[FreeTextEntry1] : s/p left breast lumpectomy with complex closure, DOS:1/17/24. Patient doing well, no complains of bleeding, drainage, fever or chills.

## 2024-01-26 NOTE — PHYSICAL EXAM
[de-identified] : Left breast incision healing well no sign of infection.  Left axilla incision healing well no sign of infection, no fluid collection.  Unable to aspirate any fluid.  Size match close between breasts.   [NI] : Normal

## 2024-01-31 ENCOUNTER — APPOINTMENT (OUTPATIENT)
Dept: RADIATION ONCOLOGY | Facility: CLINIC | Age: 64
End: 2024-01-31
Payer: MEDICARE

## 2024-01-31 VITALS
SYSTOLIC BLOOD PRESSURE: 146 MMHG | HEART RATE: 77 BPM | RESPIRATION RATE: 18 BRPM | WEIGHT: 288.8 LBS | HEIGHT: 63 IN | DIASTOLIC BLOOD PRESSURE: 79 MMHG | OXYGEN SATURATION: 96 % | BODY MASS INDEX: 51.17 KG/M2 | TEMPERATURE: 96.8 F

## 2024-01-31 PROCEDURE — 99205 OFFICE O/P NEW HI 60 MIN: CPT

## 2024-01-31 RX ORDER — ERGOCALCIFEROL 1.25 MG/1
1.25 MG CAPSULE ORAL
Qty: 8 | Refills: 0 | Status: DISCONTINUED | COMMUNITY
Start: 2023-07-18 | End: 2024-01-31

## 2024-01-31 NOTE — END OF VISIT
[] : Resident [FreeTextEntry3] : I saw and examined this patient on the date of service with my assigned resident physician, Dr. Garcia. I was involved in all procedures and laboratory/radiographic assessments. I personally confirmed pertinent history and exam findings and reviewed the patient's diagnosis and plan with them. I agree with the findings and plan as documented, unless noted below.    [Time Spent: ___ minutes] : I have spent [unfilled] minutes of time on the encounter. [>50% of the face to face encounter time was spent on counseling and/or coordination of care for ___] : Greater than 50% of the face to face encounter time was spent on counseling and/or coordination of care for [unfilled]

## 2024-01-31 NOTE — OB/GYN HISTORY
[Definite:  ___ (Date)] : the last menstrual period was [unfilled] [Menopause Age: ____] : patient was [unfilled] years old at menopause

## 2024-01-31 NOTE — REVIEW OF SYSTEMS
[Fatigue] : fatigue [Negative] : Allergic/Immunologic [FreeTextEntry7] : occasional diarrhea  [de-identified] : L breast incision sites

## 2024-01-31 NOTE — PHYSICAL EXAM
[Obese] : obese [Exaggerated Use Of Accessory Muscles For Inspiration] : no accessory muscle use [Abdomen Soft] : soft [Nondistended] : nondistended [Musculoskeletal - Swelling] : no joint swelling [Nail Clubbing] : no clubbing  or cyanosis of the fingernails [Skin Color & Pigmentation] : normal skin color and pigmentation [] : no rash [Normal] : oriented to person, place and time, the affect was normal, the mood was normal and not anxious [de-identified] : The breasts very large and pendulous.  There is a healing scar the lower inner quadrant of the breast and axilla. The breasts are without palpable masses, nipple discharge or suspicious skin changes.

## 2024-01-31 NOTE — HISTORY OF PRESENT ILLNESS
[FreeTextEntry1] : This Is a 63 year old woman with cT2N0 left invasive mucinous carcinoma G2 ER positive CO positive Her two negative with negative margins and 0/1 SLN.   She incidentally on CT chest was found to have a three centimeter left sided breast mass. MMG sono in August of 2023 showed the mass at 7:00 in the left breast measuring 4.4cm. She had biopsy in August of 2023 which revealed invasive mucinous carcinoma ER CO positive and her two negative MRI of the breast revealed the clip with a 3.3cm maximal mass, no adenopathy, with a few small foci suspicious for satellite lesions around the mass. The satellite lesions were biopsied negative. She started neoadjuvant AI 9/2023.  She had lumpectomy on January 8th 2024 which revealed mucinous carcinoma, grade 2, 3.9 centimeters, negative margins, 0 out of 1 central lymph nodes.   She presents today for consult and discussion of radiation treatment options.  She is generally feeling well, with some fatigue. Left breast surgical incision sites healing well, has next f/u scheduled with Dr. Aceves on 2/5. No pain, just mild tenderness to palpation.  Remains on anastrozole, tolerating well.   Former smoker, quit 14 years ago.  Works as . Returned to work yesterday.  PMH: HTN, right renal mass resected 2016

## 2024-02-05 ENCOUNTER — NON-APPOINTMENT (OUTPATIENT)
Age: 64
End: 2024-02-05

## 2024-02-05 DIAGNOSIS — C50.912 MALIGNANT NEOPLASM OF UNSPECIFIED SITE OF LEFT FEMALE BREAST: ICD-10-CM

## 2024-02-05 PROCEDURE — 77290 THER RAD SIMULAJ FIELD CPLX: CPT | Mod: 26

## 2024-02-05 PROCEDURE — 77263 THER RADIOLOGY TX PLNG CPLX: CPT

## 2024-02-05 PROCEDURE — 77334 RADIATION TREATMENT AID(S): CPT | Mod: 26

## 2024-02-09 PROCEDURE — 77295 3-D RADIOTHERAPY PLAN: CPT | Mod: 26

## 2024-02-09 PROCEDURE — 77334 RADIATION TREATMENT AID(S): CPT | Mod: 26

## 2024-02-09 PROCEDURE — 77300 RADIATION THERAPY DOSE PLAN: CPT | Mod: 26

## 2024-02-15 ENCOUNTER — NON-APPOINTMENT (OUTPATIENT)
Age: 64
End: 2024-02-15

## 2024-02-15 PROCEDURE — 77280 THER RAD SIMULAJ FIELD SMPL: CPT | Mod: 26

## 2024-02-20 ENCOUNTER — NON-APPOINTMENT (OUTPATIENT)
Age: 64
End: 2024-02-20

## 2024-02-21 ENCOUNTER — NON-APPOINTMENT (OUTPATIENT)
Age: 64
End: 2024-02-21

## 2024-02-21 PROCEDURE — 77387C: CUSTOM

## 2024-02-22 PROCEDURE — 77387C: CUSTOM

## 2024-02-23 ENCOUNTER — NON-APPOINTMENT (OUTPATIENT)
Age: 64
End: 2024-02-23

## 2024-02-23 PROCEDURE — 77387C: CUSTOM

## 2024-02-23 NOTE — HISTORY OF PRESENT ILLNESS
[FreeTextEntry1] : Ms. Hernandez is a 63 year old postmenopausal F with PMH clear cell renal carcinoma s/p partial nephrectomy on 6/20/16 and newly diagnosed Anatomic Stage IIA and Prognostic Stage IA [id number] invasive mucinous carcinoma of the lower inner quadrant of the left breast ER+ >%% ND+ 71-80% Her2- s/p neoadjuvant anastrozole 9/23- x 3 months with partial clinical/radiographic candidate to allow for breast conserving surgery. She subsequently underwent left wire-localized breast lumpectomy and SLNB. Pathology yielded invasive mucinous carcinoma, intermediate grade, 3.9 cm, -LVSI, close inferior margin but final margins negative, 0/1 sentinel lymph nodes positive. [employer] genetic testing with VUS in RET.  2/23/2024 He presents for on treatment visit. Completed 4/15 Fx. Reviewed skin care. No issues today.

## 2024-02-23 NOTE — DISEASE MANAGEMENT
[TTNM] : 2 [NTNM] : 0 [MTNM] : 0 [de-identified] : 2784 [de-identified] : [id number] [de-identified] : Left Breast

## 2024-02-23 NOTE — PHYSICAL EXAM
[Normal] : well developed, well nourished, in no acute distress [Skin Color & Pigmentation] : normal skin color and pigmentation [] : no rash [de-identified] : The breasts very large and pendulous. There is a healing scar the lower inner quadrant of the breast and axilla. The breasts are without palpable masses, nipple discharge or suspicious skin changes.

## 2024-02-26 ENCOUNTER — NON-APPOINTMENT (OUTPATIENT)
Age: 64
End: 2024-02-26

## 2024-02-26 VITALS
TEMPERATURE: 97.9 F | OXYGEN SATURATION: 98 % | HEART RATE: 74 BPM | RESPIRATION RATE: 18 BRPM | DIASTOLIC BLOOD PRESSURE: 89 MMHG | BODY MASS INDEX: 50.57 KG/M2 | WEIGHT: 285.38 LBS | HEIGHT: 63 IN | SYSTOLIC BLOOD PRESSURE: 145 MMHG

## 2024-02-26 PROCEDURE — 77427 RADIATION TX MANAGEMENT X5: CPT

## 2024-02-26 PROCEDURE — 77387C: CUSTOM

## 2024-02-26 NOTE — HISTORY OF PRESENT ILLNESS
[FreeTextEntry1] : Ms. Hernandez is a 63 year old postmenopausal F with PMH clear cell renal carcinoma s/p partial nephrectomy on 6/20/16 and newly diagnosed Anatomic Stage IIA and Prognostic Stage IA [id number] invasive mucinous carcinoma of the lower inner quadrant of the left breast ER+ >%% IL+ 71-80% Her2- s/p neoadjuvant anastrozole 9/23- x 3 months with partial clinical/radiographic candidate to allow for breast conserving surgery. She subsequently underwent left wire-localized breast lumpectomy and SLNB. Pathology yielded invasive mucinous carcinoma, intermediate grade, 3.9 cm, -LVSI, close inferior margin but final margins negative, 0/1 sentinel lymph nodes positive. [employer] genetic testing with VUS in RET.  2/23/2024 He presents for on treatment visit. Completed 4/15 Fx. Reviewed skin care. No issues today.  2/26/2024 He presents for on treatment visit. Completed 5/15 Fx. Using Calendula for skin care. No issues today.

## 2024-02-26 NOTE — DISEASE MANAGEMENT
[Pathological] : TNM Stage: p [IIA] : IIA [TTNM] : 2 [NTNM] : 0 [MTNM] : 0 [de-identified] : 8404 [de-identified] : [id number] [de-identified] : Left Breast

## 2024-02-26 NOTE — PHYSICAL EXAM
[Normal] : well developed, well nourished, in no acute distress [Skin Color & Pigmentation] : normal skin color and pigmentation [] : no rash [de-identified] : The breasts very large and pendulous. There is a healing scar the lower inner quadrant of the breast and axilla. The breasts are without palpable masses, nipple discharge or suspicious skin changes.

## 2024-02-27 PROCEDURE — 77387C: CUSTOM

## 2024-02-28 PROCEDURE — 77387C: CUSTOM

## 2024-02-29 PROCEDURE — 77387C: CUSTOM

## 2024-03-01 PROCEDURE — 77387C: CUSTOM

## 2024-03-04 ENCOUNTER — APPOINTMENT (OUTPATIENT)
Dept: PLASTIC SURGERY | Facility: CLINIC | Age: 64
End: 2024-03-04

## 2024-03-04 ENCOUNTER — NON-APPOINTMENT (OUTPATIENT)
Age: 64
End: 2024-03-04

## 2024-03-04 VITALS
DIASTOLIC BLOOD PRESSURE: 85 MMHG | TEMPERATURE: 98.4 F | HEART RATE: 77 BPM | BODY MASS INDEX: 49.61 KG/M2 | SYSTOLIC BLOOD PRESSURE: 132 MMHG | RESPIRATION RATE: 18 BRPM | OXYGEN SATURATION: 97 % | HEIGHT: 63 IN | WEIGHT: 280 LBS

## 2024-03-04 DIAGNOSIS — L58.9 RADIODERMATITIS, UNSPECIFIED: ICD-10-CM

## 2024-03-04 PROCEDURE — 77427 RADIATION TX MANAGEMENT X5: CPT

## 2024-03-04 PROCEDURE — 77387C: CUSTOM

## 2024-03-04 RX ORDER — MOMETASONE FUROATE 1 MG/G
0.1 CREAM TOPICAL TWICE DAILY
Qty: 1 | Refills: 1 | Status: ACTIVE | COMMUNITY
Start: 2024-03-04 | End: 1900-01-01

## 2024-03-05 PROCEDURE — 77387C: CUSTOM

## 2024-03-05 NOTE — HISTORY OF PRESENT ILLNESS
[FreeTextEntry1] : Ms. Hernandez is a 63 year old postmenopausal F with PMH clear cell renal carcinoma s/p partial nephrectomy on 6/20/16 and newly diagnosed Anatomic Stage IIA and Prognostic Stage IA [id number] invasive mucinous carcinoma of the lower inner quadrant of the left breast ER+ >%% TX+ 71-80% Her2- s/p neoadjuvant anastrozole 9/23- x 3 months with partial clinical/radiographic candidate to allow for breast conserving surgery. She subsequently underwent left wire-localized breast lumpectomy and SLNB. Pathology yielded invasive mucinous carcinoma, intermediate grade, 3.9 cm, -LVSI, close inferior margin but final margins negative, 0/1 sentinel lymph nodes positive. [employer] genetic testing with VUS in RET.  2/23/2024 She presents for on treatment visit. Completed 4/15 Fx. Reviewed skin care. No issues today.  2/26/2024 She presents for on treatment visit. Completed 5/15 Fx. Using Calendula for skin care. No issues today.  3/4/2024 She presents for on treatment visit. Completed 10/15 Fx. Using Calendula and Aquaphor for skin care. Mild redness to mid chest noted

## 2024-03-05 NOTE — PHYSICAL EXAM
[Normal] : well developed, well nourished, in no acute distress [Skin Color & Pigmentation] : normal skin color and pigmentation [] : no rash [de-identified] : The breasts large and pendulous. There is a healing scar the lower inner quadrant of the breast and axilla. The breasts are without palpable masses, nipple discharge or suspicious skin changes.   [de-identified] : Slight erythema of chest

## 2024-03-06 PROCEDURE — 77387C: CUSTOM

## 2024-03-07 PROCEDURE — 77387C: CUSTOM

## 2024-03-08 PROCEDURE — 77387C: CUSTOM

## 2024-03-10 NOTE — ASSESSMENT
[FreeTextEntry1] : I, Dr.Armen Aceves  personally performed the evaluation and management (E/M) services for this new patient. That E/M includes conducting the clinically appropriate initial history &/or exam, assessing all conditions, and establishing the plan of care. Today, my ARNALDO, THERESE Raymond, was here to observe my evaluation and management service for this patient & follow plan of care established by me going forward.

## 2024-03-10 NOTE — HISTORY OF PRESENT ILLNESS
[FreeTextEntry1] : Pt presents today to discuss breast reconstruction options after being dx with left breast cancer in Sept 2023.  Cancer was found on CT scan of the chest performed for lung cancer screening.  Pt has left breast lower inner quadrant cancer and has been treated with 3 months of neoadjuvant antiestrogen therapy.  Pt has no personal hx of breast disease, but she does have a family hx of breast cancer including her maternal and paternal cousin.  She has had malignancy of right kidney in 2016 for which she received right partial nephrectomy.  Pt states she would like to have a lumpectomy.  Pt BMI is 50.31

## 2024-03-10 NOTE — PHYSICAL EXAM
[NI] : Normal [de-identified] : +pendulous breasts bilaterally.  Palpable mass near IMF medially [de-identified] : Large abdominal pannus

## 2024-03-10 NOTE — REASON FOR VISIT
[Consultation] : a consultation visit [FreeTextEntry1] : Patient presents today for a Left Breast reconstruction consultation, patient state she was diagnosed with breast cancer in September 2023 through a routine Cat scan.  patient had an Utra sound, mammogram and MRI done, patient had a genetic testing done and result was negative.  she denies any past history of breast cancer or miscarriages in the past, denies pain or discharge from breast. Patient had a cholecystectomy and partial nephrectomy done in the past. Patient has a aurgery date with

## 2024-03-10 NOTE — REVIEW OF SYSTEMS
[Fever] : no fever [Chills] : no chills [Negative] : Endocrine [FreeTextEntry5] : +HTN [FreeTextEntry8] : Hx of right renal cell cancer [FreeTextEntry6] : +ex-smoker [de-identified] : breast cancer

## 2024-03-11 ENCOUNTER — NON-APPOINTMENT (OUTPATIENT)
Age: 64
End: 2024-03-11

## 2024-03-11 ENCOUNTER — OUTPATIENT (OUTPATIENT)
Dept: OUTPATIENT SERVICES | Facility: HOSPITAL | Age: 64
LOS: 1 days | Discharge: HOME | End: 2024-03-11

## 2024-03-11 VITALS
TEMPERATURE: 98.1 F | OXYGEN SATURATION: 95 % | BODY MASS INDEX: 49.6 KG/M2 | DIASTOLIC BLOOD PRESSURE: 84 MMHG | WEIGHT: 280 LBS | RESPIRATION RATE: 18 BRPM | HEART RATE: 75 BPM | SYSTOLIC BLOOD PRESSURE: 150 MMHG

## 2024-03-11 DIAGNOSIS — C44.191 OTHER SPECIFIED MALIGNANT NEOPLASM OF SKIN OF UNSPECIFIED EYELID, INCLUDING CANTHUS: ICD-10-CM

## 2024-03-11 DIAGNOSIS — D30.02 BENIGN NEOPLASM OF LEFT KIDNEY: Chronic | ICD-10-CM

## 2024-03-11 DIAGNOSIS — Z90.49 ACQUIRED ABSENCE OF OTHER SPECIFIED PARTS OF DIGESTIVE TRACT: Chronic | ICD-10-CM

## 2024-03-11 DIAGNOSIS — Z90.89 ACQUIRED ABSENCE OF OTHER ORGANS: Chronic | ICD-10-CM

## 2024-03-11 PROCEDURE — 77387C: CUSTOM

## 2024-03-11 PROCEDURE — 77427 RADIATION TX MANAGEMENT X5: CPT

## 2024-03-11 RX ORDER — SILVER SULFADIAZINE 10 MG/G
1 CREAM TOPICAL TWICE DAILY
Qty: 1 | Refills: 0 | Status: ACTIVE | COMMUNITY
Start: 2024-03-11 | End: 1900-01-01

## 2024-03-11 NOTE — PHYSICAL EXAM
[Normal] : well developed, well nourished, in no acute distress [] : no rash [de-identified] : The breasts large and pendulous. [de-identified] : Slight erythema of chest

## 2024-03-11 NOTE — DISEASE MANAGEMENT
[Pathological] : TNM Stage: p [IIA] : IIA [TTNM] : 2 [NTNM] : 0 [MTNM] : 0 [de-identified] : 9732 [de-identified] : [id number] [de-identified] : Left Breast

## 2024-03-11 NOTE — HISTORY OF PRESENT ILLNESS
[FreeTextEntry1] : Ms. 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 She subsequently underwent left wire-localized breast lumpectomy and SLNB. Pathology yielded invasive mucinous carcinoma, intermediate grade, 3.9 cm, -LVSI, close inferior margin but final margins negative, 0/1 sentinel lymph nodes positive. [facility] genetic testing with VUS in RET.  2/23/2024 She presents for on treatment visit. Completed 4/15 Fx. Reviewed skin care. No issues today.  2/26/2024 She presents for on treatment visit. Completed 5/15 Fx. Using Calendula for skin care. No issues today.  3/4/2024 She presents for on treatment visit. Completed 10/15 Fx. Using Calendula and Aquaphor for skin care. Mild redness to mid chest noted. Started on Mometasone cream.  3/11/2024 She presents for on treatment visit. Completed 15/15 Fx. Using Calendula and Aquaphor for skin care and added Mometasone to midchest. Noted area of moist desquamation with small open area to left breast inframammary fold. Started on Silvadene. Discharge and PTE appointment provided. Labored

## 2024-03-11 NOTE — REASON FOR VISIT
[Routine On-Treatment] : a routine on-treatment visit for [Head and Neck Cancer] : head and neck cancer [Family Member] : family member [Other: _____] : [unfilled]

## 2024-03-11 NOTE — REVIEW OF SYSTEMS
[Dermatitis Radiation: Grade 1 - Faint erythema or dry desquamation] : Dermatitis Radiation: Grade 1 - Faint erythema or dry desquamation [Dermatitis Radiation: Grade 2 - Moderate to brisk erythema; patchy moist desquamation, mostly confined to skin folds and creases; moderate edema] : Dermatitis Radiation: Grade 2 - Moderate to brisk erythema; patchy moist desquamation, mostly confined to skin folds and creases; moderate edema

## 2024-03-18 ENCOUNTER — APPOINTMENT (OUTPATIENT)
Dept: HEMATOLOGY ONCOLOGY | Facility: CLINIC | Age: 64
End: 2024-03-18
Payer: MEDICARE

## 2024-03-18 VITALS
WEIGHT: 279.99 LBS | SYSTOLIC BLOOD PRESSURE: 145 MMHG | DIASTOLIC BLOOD PRESSURE: 84 MMHG | TEMPERATURE: 97 F | BODY MASS INDEX: 49.6 KG/M2 | HEART RATE: 75 BPM | OXYGEN SATURATION: 79 % | RESPIRATION RATE: 16 BRPM

## 2024-03-18 PROCEDURE — 99214 OFFICE O/P EST MOD 30 MIN: CPT

## 2024-03-18 NOTE — ASSESSMENT
[FreeTextEntry1] : She is a 64 y/o  F with clinical T2N0 ER positive, DC positive, Her2 negative breast cancer: mucinous histology. She started on endocrine neoadjuvant therapy on 9/2023 with anastrozole. She underwent lumpectomy with Dr Lopez and had residual disease: invasive mucinous carcinoma and negative SLN. She completed RT to the L breast and will restart anastrozole 3/2024. We reviewed low fat diet and exercise daily to help improve breast cancer survival as per WINS study. We reviewed calcium and Vitamin D supplementation along with exercise to maintain bone health. Next follow up in 4 months but earlier if any new symptoms.  Pulmonary nodule: ROLF seen on CT chest 6/2023: had interval 11/2023 and will have follow up CT in May 2024.   Low vitamin D: will repeat on next visit. She did not take the supplement prescribed since low reading in July. She will take the supplement.

## 2024-03-18 NOTE — PHYSICAL EXAM
[Fully active, able to carry on all pre-disease performance without restriction] : Status 0 - Fully active, able to carry on all pre-disease performance without restriction [Normal] : affect appropriate [de-identified] : hyperpigmentation over the L chest wall: clean base excoriation under the bra line L breast; no erythema

## 2024-03-18 NOTE — END OF VISIT
Labs are normal except mild elevated CCP antibody.  This antibody can be associated with rheumatoid arthritis, but the tests of inflammation are normal so most likely not RA at this point.
[Time Spent: ___ minutes] : I have spent [unfilled] minutes of time on the encounter.

## 2024-03-18 NOTE — HISTORY OF PRESENT ILLNESS
[Disease: _____________________] : Disease: [unfilled] [T: ___] : T[unfilled] [de-identified] : Age 56: R RCC s/p partial nephrectomy with Dr Troy Age 63: left breast cancer Incidental finding from CT chest showing indeterminant 3 cm medial L breast mass along with indeterminate pulmonary nodules. She had mammogram/ sonogram done on 8/10/2023 which showed left breast 7:00 9 cm from the nipple correlating with mammographic mass measuring 4.4 x 2.4 x 3.6 cm.  She had left 7:00 u/s guided biopsy on 8/14/2023. The pathology showed invasive mucinous carcinoma SBR 6/9 measuring at least 15 mm, ER > 90%, AR 80%, Her2 negative (1). She had MRI of the breast done on 8/24/2023 which showed irregular heterogeneously enhancing mass containing a biopsy clip measuring 3.3 x 3.2 x 2.4 cm compatible with biopsy proven carcinoma, no significant axillary or internal mammary adenopathy, few foci of enhancement surrounding the dominant biopsy proven carcinoma suspicious for satellite lesions. She had MRI guided biopsy of the satellite lesions which showed benign breast parenchyma with stromal fibrosis, apocrine metaplasia and cystic changes. She started on anastrozole 9/2023 as neoadjuvant therapy. She underwent lumpectomy with SLNB with Dr Lopez on 1/17/2024. 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 She underwent RT with Dr Urias and completed RT 3/2024. She resumed anastrozole 3/2024.  [de-identified] : invasive mucinous carcinoma ER > 90%, MT 80%, Her2 negative [de-identified] : Since last visit, she has developed excoriation under the L breast: has been applying silvadene over the breast: called rad onc and also apply triple antibiotic ointment. She has R knee pain: feels aggravated with getting on and off the exam table. She denies any new cough or HA. Will resume anastrozole 3/25/2024.  [de-identified] : anastrozole 9/2023 to present

## 2024-03-18 NOTE — REVIEW OF SYSTEMS
[Diarrhea: Grade 0] : Diarrhea: Grade 0 [Negative] : Allergic/Immunologic [Joint Pain] : joint pain [Joint Stiffness] : no joint stiffness [Muscle Pain] : no muscle pain [Muscle Weakness] : no muscle weakness [Skin Rash] : no skin rash [Skin Wound] : no skin wound [FreeTextEntry9] : R knee pain  [de-identified] : skin excoriation under the L breast

## 2024-03-26 ENCOUNTER — RX RENEWAL (OUTPATIENT)
Age: 64
End: 2024-03-26

## 2024-03-26 RX ORDER — ANASTROZOLE TABLETS 1 MG/1
1 TABLET ORAL
Qty: 90 | Refills: 1 | Status: ACTIVE | COMMUNITY
Start: 2023-09-19 | End: 1900-01-01

## 2024-04-15 ENCOUNTER — APPOINTMENT (OUTPATIENT)
Dept: RADIATION ONCOLOGY | Facility: CLINIC | Age: 64
End: 2024-04-15
Payer: MEDICARE

## 2024-04-15 VITALS
HEIGHT: 63 IN | OXYGEN SATURATION: 96 % | HEART RATE: 73 BPM | TEMPERATURE: 96.98 F | SYSTOLIC BLOOD PRESSURE: 155 MMHG | BODY MASS INDEX: 49.43 KG/M2 | DIASTOLIC BLOOD PRESSURE: 91 MMHG | WEIGHT: 279 LBS | RESPIRATION RATE: 17 BRPM

## 2024-04-15 PROCEDURE — 99213 OFFICE O/P EST LOW 20 MIN: CPT

## 2024-04-15 NOTE — PHYSICAL EXAM
[Symmetric] : breasts are symmetric [Breast Palpation Mass] : no palpable masses [Breast Abnormal Lactation (Galactorrhea)] : no nipple discharge [No UE Edema] : there is no upper extremity edema [Obese] : obese [Abdomen Soft] : soft [Nondistended] : nondistended [Musculoskeletal - Swelling] : no joint swelling [Nail Clubbing] : no clubbing  or cyanosis of the fingernails [No Focal Deficits] : no focal deficits [Sensation] : the sensory exam was normal to light touch and pinprick [Normal] : oriented to person, place and time, the affect was normal, the mood was normal and not anxious [de-identified] : The breasts very large and pendulous. There is a healing scar the lower inner quadrant of the breast and axilla. The breasts are without palpable masses, nipple discharge or suspicious skin changes. Resolving hyperpigmentation and inframammary superficial desquamation [de-identified] : mild to moderate hyperpigmentation most prominent in inframammary fold with dry desequamation

## 2024-04-15 NOTE — HISTORY OF PRESENT ILLNESS
[FreeTextEntry1] : Ms. 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 She subsequently underwent left wire-localized breast lumpectomy and SLNB. Pathology yielded invasive mucinous carcinoma, intermediate grade, 3.9 cm, -LVSI, close inferior margin but final margins negative, 0/1 sentinel lymph nodes positive. [facility] genetic testing with VUS in RET. 3/11/2024 She completed radiation therapy to a total dose of 4005 cGy in 15 fractions to the Left breast.   4/15/2024 She presents for post treatment evaluation. While on treatment, she developed skin desquamation managed with Silvadene. Today using Aquaphor for almost resolving hyperpigmentation and superficial peeling. She is working full time as .  Started on Anastrozole since 9/2023. Stopped during Radiation treatment, resumed 3/25/2024 with no issues. Seeing Dr Dyson 6/2024 Diag mammo and chico in 8/2024

## 2024-04-29 ENCOUNTER — APPOINTMENT (OUTPATIENT)
Dept: HEMATOLOGY ONCOLOGY | Facility: CLINIC | Age: 64
End: 2024-04-29

## 2024-05-13 ENCOUNTER — APPOINTMENT (OUTPATIENT)
Dept: CT IMAGING | Facility: CLINIC | Age: 64
End: 2024-05-13
Payer: MEDICARE

## 2024-05-13 PROCEDURE — 71250 CT THORAX DX C-: CPT

## 2024-06-03 ENCOUNTER — APPOINTMENT (OUTPATIENT)
Dept: PLASTIC SURGERY | Facility: CLINIC | Age: 64
End: 2024-06-03

## 2024-06-09 PROBLEM — C50.312 MALIGNANT NEOPLASM OF LOWER-INNER QUADRANT OF LEFT BREAST IN FEMALE, ESTROGEN RECEPTOR POSITIVE: Status: ACTIVE | Noted: 2023-08-21

## 2024-06-10 ENCOUNTER — APPOINTMENT (OUTPATIENT)
Dept: SURGICAL ONCOLOGY | Facility: CLINIC | Age: 64
End: 2024-06-10
Payer: MEDICARE

## 2024-06-10 VITALS
WEIGHT: 280 LBS | BODY MASS INDEX: 47.8 KG/M2 | DIASTOLIC BLOOD PRESSURE: 89 MMHG | HEART RATE: 57 BPM | SYSTOLIC BLOOD PRESSURE: 138 MMHG | HEIGHT: 64 IN | OXYGEN SATURATION: 96 %

## 2024-06-10 DIAGNOSIS — C50.312 MALIGNANT NEOPLASM OF LOWER-INNER QUADRANT OF LEFT FEMALE BREAST: ICD-10-CM

## 2024-06-10 DIAGNOSIS — Z17.0 MALIGNANT NEOPLASM OF LOWER-INNER QUADRANT OF LEFT FEMALE BREAST: ICD-10-CM

## 2024-06-10 PROCEDURE — 99215 OFFICE O/P EST HI 40 MIN: CPT

## 2024-06-11 NOTE — HISTORY OF PRESENT ILLNESS
[de-identified] : 64-year-old lady.  Tyrer-Cuzick risk score = 8.1.  Breast cancer follow-up.  CC: Some sensitivity in the left breast and axillary region related to the radiation. No other specific or constitutional signs or symptoms.   2024: Left breast conserving surgery with oncoplastic closure by Dr. Ashok Aceves, after a 3-month course of neoadjuvant anastrozole. Surgical pathology: T = 3.9 cm. 0/3 left axillary LN.  + ER/CO.  2024: Commenced radiation therapy. Completed 3/11/2024. Radiation oncology: Dr. Jasmin ESTES.  She has remained on anastrozole. Medical oncology: Dr. Tripp GAGNON.   2023: Referred with a ~4.4 cm invasive cancer of her LEFT BREAST (LIQ).  This was an asymptomatic nonpalpable finding identified in the medial left breast on CT scan of the chest performed for lung cancer screening.   2023: Preoperative breast MRI: BI-RADS 4. + Additional ipsilateral (left breast) anterior to the known malignancy. Subsequent MRI guided core biopsy was benign and concordant.  2023: Initiated neoadjuvant systemic therapy with anastrozole. Medical oncology: Dr. Tripp GAGNON.   Genetic testing (Promod[employer]) 84 gene assay: Negative 2023: VUS in RET.   Remainder of her extent of disease evaluation: May 2023: CT abdomen and pelvis was unremarkable. 2023 CT chest: Equivocal findings. 2023: Follow-up CT chest: No interval changes. May 2024: Follow-up CT chest system: No interval changes. Annual follow-up recommended for May 2025. Prescription entered/provided today.   No previous personal history of breast disease. No prior breast biopsies.   + Prior personal history of malignancy: 2016 (she was 56 years old) right partial nephrectomy for renal cell carcinoma (RCCA). No adjuvant therapy required. Continues to follow-up with her urologist: Dr. Praveen VERGARA.  No other personal history of malignancy.   + FH: Maternal cousin: Breast cancer. Paternal cousin also had breast cancer. Not sure if either one of them had genetic testing.  A maternal aunt may have had ovarian cancer, our patient is not certain.  Not Ashkenazi.  + Additional family history of malignancy: Brother: Lung cancer. A paternal uncle also had lung cancer.   Menarche at 9.  1, para 1 at 36. Natural menopause at 48. No HRT   PMD: Dr. Gabe SERRATO.  NKDA.  No pacemaker or defibrillator. No anticoagulants  + Hypertension. Treated with amlodipine. She does not see a cardiologist.  + History of right renal cell cancer (RCCA). Right partial nephrectomy in . + Left renal angiolipoma. Urology: Dr. Praveen VERGARA  + Ex-smoker. Pulmonary: THERESE Herman with the [facility].   She has not seen a gynecologist since age 38.   She has never had a colonoscopy.

## 2024-06-11 NOTE — ASSESSMENT
[FreeTextEntry1] : 64-year-old lady.  January 2024: Left breast conserving surgery after a 3-month course of neoadjuvant anastrozole.  Started radiation therapy February 2024.  Remains on anastrozole.  Follow-up CT scan of the chest is due May 2025. Prescription entered/provided.  Annual mammogram should be August 2024. Prescription entered/provided.  Clinically doing well.  If asymptomatic, with normal imaging, we should see her in another year, sooner if needed.  Reviewed in detail, all questions answered.

## 2024-06-11 NOTE — REVIEW OF SYSTEMS
[Negative] : Endocrine [FreeTextEntry5] : Hypertension [FreeTextEntry7] : NKDA [FreeTextEntry1] : Breast cancer [FreeTextEntry9] : Kidney cancer

## 2024-06-11 NOTE — REASON FOR VISIT
[Follow-Up Visit] : a follow-up visit for [Other: _____] : [unfilled] [FreeTextEntry2] : Left breast cancer treated with breast conserving surgery and oncoplastic closure, after neoadjuvant anastrozole.

## 2024-06-11 NOTE — PHYSICAL EXAM
[Normal] : supple, no neck mass and thyroid not enlarged [Normal Neck Lymph Nodes] : normal neck lymph nodes  [Normal Supraclavicular Lymph Nodes] : normal supraclavicular lymph nodes [Normal Axillary Lymph Nodes] : normal axillary lymph nodes [Normal] : normal appearance, no rash, nodules, vesicles, ulcers, erythema [de-identified] : Groins not examined [de-identified] : Below

## 2024-06-19 ENCOUNTER — RX RENEWAL (OUTPATIENT)
Age: 64
End: 2024-06-19

## 2024-07-07 ENCOUNTER — INPATIENT (INPATIENT)
Facility: HOSPITAL | Age: 64
LOS: 1 days | Discharge: HOME | End: 2024-07-09
Attending: SPECIALIST | Admitting: SPECIALIST
Payer: MEDICARE

## 2024-07-07 VITALS
SYSTOLIC BLOOD PRESSURE: 121 MMHG | DIASTOLIC BLOOD PRESSURE: 73 MMHG | OXYGEN SATURATION: 95 % | RESPIRATION RATE: 16 BRPM | WEIGHT: 270.07 LBS | HEIGHT: 66 IN | TEMPERATURE: 98 F | HEART RATE: 75 BPM

## 2024-07-07 DIAGNOSIS — N61.1 ABSCESS OF THE BREAST AND NIPPLE: ICD-10-CM

## 2024-07-07 DIAGNOSIS — Z90.49 ACQUIRED ABSENCE OF OTHER SPECIFIED PARTS OF DIGESTIVE TRACT: Chronic | ICD-10-CM

## 2024-07-07 DIAGNOSIS — D30.02 BENIGN NEOPLASM OF LEFT KIDNEY: Chronic | ICD-10-CM

## 2024-07-07 DIAGNOSIS — Z90.89 ACQUIRED ABSENCE OF OTHER ORGANS: Chronic | ICD-10-CM

## 2024-07-07 LAB
ALBUMIN SERPL ELPH-MCNC: 3.9 G/DL — SIGNIFICANT CHANGE UP (ref 3.3–5)
ALP SERPL-CCNC: 107 U/L — SIGNIFICANT CHANGE UP (ref 40–120)
ALT FLD-CCNC: 64 U/L — HIGH (ref 4–33)
ANION GAP SERPL CALC-SCNC: 12 MMOL/L — SIGNIFICANT CHANGE UP (ref 7–14)
APTT BLD: 31.8 SEC — SIGNIFICANT CHANGE UP (ref 24.5–35.6)
AST SERPL-CCNC: 56 U/L — HIGH (ref 4–32)
BASE EXCESS BLDV CALC-SCNC: 1.7 MMOL/L — SIGNIFICANT CHANGE UP (ref -2–3)
BASOPHILS # BLD AUTO: 0.04 K/UL — SIGNIFICANT CHANGE UP (ref 0–0.2)
BASOPHILS NFR BLD AUTO: 0.7 % — SIGNIFICANT CHANGE UP (ref 0–2)
BILIRUB SERPL-MCNC: 1.2 MG/DL — SIGNIFICANT CHANGE UP (ref 0.2–1.2)
BLD GP AB SCN SERPL QL: NEGATIVE — SIGNIFICANT CHANGE UP
BLOOD GAS VENOUS COMPREHENSIVE RESULT: SIGNIFICANT CHANGE UP
BUN SERPL-MCNC: 11 MG/DL — SIGNIFICANT CHANGE UP (ref 7–23)
CALCIUM SERPL-MCNC: 9.5 MG/DL — SIGNIFICANT CHANGE UP (ref 8.4–10.5)
CHLORIDE BLDV-SCNC: 106 MMOL/L — SIGNIFICANT CHANGE UP (ref 96–108)
CHLORIDE SERPL-SCNC: 104 MMOL/L — SIGNIFICANT CHANGE UP (ref 98–107)
CO2 BLDV-SCNC: 28.6 MMOL/L — HIGH (ref 22–26)
CO2 SERPL-SCNC: 25 MMOL/L — SIGNIFICANT CHANGE UP (ref 22–31)
CREAT SERPL-MCNC: 0.73 MG/DL — SIGNIFICANT CHANGE UP (ref 0.5–1.3)
CRP SERPL-MCNC: 112.6 MG/L — HIGH
EGFR: 91 ML/MIN/1.73M2 — SIGNIFICANT CHANGE UP
EOSINOPHIL # BLD AUTO: 0.07 K/UL — SIGNIFICANT CHANGE UP (ref 0–0.5)
EOSINOPHIL NFR BLD AUTO: 1.1 % — SIGNIFICANT CHANGE UP (ref 0–6)
ERYTHROCYTE [SEDIMENTATION RATE] IN BLOOD: 79 MM/HR — HIGH (ref 4–25)
GAS PNL BLDV: 137 MMOL/L — SIGNIFICANT CHANGE UP (ref 136–145)
GAS PNL BLDV: SIGNIFICANT CHANGE UP
GLUCOSE BLDV-MCNC: 110 MG/DL — HIGH (ref 70–99)
GLUCOSE SERPL-MCNC: 109 MG/DL — HIGH (ref 70–99)
HCO3 BLDV-SCNC: 27 MMOL/L — SIGNIFICANT CHANGE UP (ref 22–29)
HCT VFR BLD CALC: 40.1 % — SIGNIFICANT CHANGE UP (ref 34.5–45)
HCT VFR BLDA CALC: 39 % — SIGNIFICANT CHANGE UP (ref 34.5–46.5)
HGB BLD CALC-MCNC: 13 G/DL — SIGNIFICANT CHANGE UP (ref 11.7–16.1)
HGB BLD-MCNC: 12.9 G/DL — SIGNIFICANT CHANGE UP (ref 11.5–15.5)
IANC: 4.6 K/UL — SIGNIFICANT CHANGE UP (ref 1.8–7.4)
IMM GRANULOCYTES NFR BLD AUTO: 0.3 % — SIGNIFICANT CHANGE UP (ref 0–0.9)
INR BLD: 1.13 RATIO — SIGNIFICANT CHANGE UP (ref 0.85–1.18)
LACTATE BLDV-MCNC: 1.3 MMOL/L — SIGNIFICANT CHANGE UP (ref 0.5–2)
LYMPHOCYTES # BLD AUTO: 0.92 K/UL — LOW (ref 1–3.3)
LYMPHOCYTES # BLD AUTO: 15 % — SIGNIFICANT CHANGE UP (ref 13–44)
MCHC RBC-ENTMCNC: 25.2 PG — LOW (ref 27–34)
MCHC RBC-ENTMCNC: 32.2 GM/DL — SIGNIFICANT CHANGE UP (ref 32–36)
MCV RBC AUTO: 78.5 FL — LOW (ref 80–100)
MONOCYTES # BLD AUTO: 0.5 K/UL — SIGNIFICANT CHANGE UP (ref 0–0.9)
MONOCYTES NFR BLD AUTO: 8.1 % — SIGNIFICANT CHANGE UP (ref 2–14)
NEUTROPHILS # BLD AUTO: 4.6 K/UL — SIGNIFICANT CHANGE UP (ref 1.8–7.4)
NEUTROPHILS NFR BLD AUTO: 74.8 % — SIGNIFICANT CHANGE UP (ref 43–77)
NRBC # BLD: 0 /100 WBCS — SIGNIFICANT CHANGE UP (ref 0–0)
NRBC # FLD: 0 K/UL — SIGNIFICANT CHANGE UP (ref 0–0)
PCO2 BLDV: 45 MMHG — SIGNIFICANT CHANGE UP (ref 39–52)
PH BLDV: 7.39 — SIGNIFICANT CHANGE UP (ref 7.32–7.43)
PLATELET # BLD AUTO: 297 K/UL — SIGNIFICANT CHANGE UP (ref 150–400)
PO2 BLDV: 36 MMHG — SIGNIFICANT CHANGE UP (ref 25–45)
POTASSIUM BLDV-SCNC: 3.5 MMOL/L — SIGNIFICANT CHANGE UP (ref 3.5–5.1)
POTASSIUM SERPL-MCNC: 3.7 MMOL/L — SIGNIFICANT CHANGE UP (ref 3.5–5.3)
POTASSIUM SERPL-SCNC: 3.7 MMOL/L — SIGNIFICANT CHANGE UP (ref 3.5–5.3)
PROT SERPL-MCNC: 7.7 G/DL — SIGNIFICANT CHANGE UP (ref 6–8.3)
PROTHROM AB SERPL-ACNC: 12.7 SEC — SIGNIFICANT CHANGE UP (ref 9.5–13)
RBC # BLD: 5.11 M/UL — SIGNIFICANT CHANGE UP (ref 3.8–5.2)
RBC # FLD: 14.9 % — HIGH (ref 10.3–14.5)
RH IG SCN BLD-IMP: POSITIVE — SIGNIFICANT CHANGE UP
SAO2 % BLDV: 58.1 % — LOW (ref 67–88)
SODIUM SERPL-SCNC: 141 MMOL/L — SIGNIFICANT CHANGE UP (ref 135–145)
WBC # BLD: 6.15 K/UL — SIGNIFICANT CHANGE UP (ref 3.8–10.5)
WBC # FLD AUTO: 6.15 K/UL — SIGNIFICANT CHANGE UP (ref 3.8–10.5)

## 2024-07-07 PROCEDURE — 76642 ULTRASOUND BREAST LIMITED: CPT | Mod: 26,LT

## 2024-07-07 PROCEDURE — 71260 CT THORAX DX C+: CPT | Mod: 26,MC

## 2024-07-07 PROCEDURE — 99285 EMERGENCY DEPT VISIT HI MDM: CPT

## 2024-07-07 RX ORDER — FLUCONAZOLE 200 MG
150 TABLET ORAL ONCE
Refills: 0 | Status: COMPLETED | OUTPATIENT
Start: 2024-07-07 | End: 2024-07-07

## 2024-07-07 RX ORDER — AMLODIPINE BESYLATE 2.5 MG/1
5 TABLET ORAL DAILY
Refills: 0 | Status: DISCONTINUED | OUTPATIENT
Start: 2024-07-07 | End: 2024-07-09

## 2024-07-07 RX ORDER — CEFAZOLIN 10 G/1
INJECTION, POWDER, FOR SOLUTION INTRAVENOUS
Refills: 0 | Status: DISCONTINUED | OUTPATIENT
Start: 2024-07-07 | End: 2024-07-07

## 2024-07-07 RX ORDER — CEFAZOLIN 10 G/1
2000 INJECTION, POWDER, FOR SOLUTION INTRAVENOUS ONCE
Refills: 0 | Status: COMPLETED | OUTPATIENT
Start: 2024-07-07 | End: 2024-07-07

## 2024-07-07 RX ORDER — DEXTROSE MONOHYDRATE, SODIUM CHLORIDE, AND POTASSIUM CHLORIDE 50; 4.5; 2.24 G/1000ML; G/1000ML; G/1000ML
1000 INJECTION, SOLUTION INTRAVENOUS
Refills: 0 | Status: DISCONTINUED | OUTPATIENT
Start: 2024-07-07 | End: 2024-07-08

## 2024-07-07 RX ORDER — CEFAZOLIN 10 G/1
2000 INJECTION, POWDER, FOR SOLUTION INTRAVENOUS EVERY 8 HOURS
Refills: 0 | Status: DISCONTINUED | OUTPATIENT
Start: 2024-07-07 | End: 2024-07-09

## 2024-07-07 RX ORDER — DEXTROSE MONOHYDRATE AND SODIUM CHLORIDE 5; .3 G/100ML; G/100ML
1000 INJECTION, SOLUTION INTRAVENOUS
Refills: 0 | Status: DISCONTINUED | OUTPATIENT
Start: 2024-07-07 | End: 2024-07-07

## 2024-07-07 RX ORDER — PIPERACILLIN SODIUM AND TAZOBACTAM SODIUM 3; .375 G/15ML; G/15ML
3.38 INJECTION, POWDER, LYOPHILIZED, FOR SOLUTION INTRAVENOUS ONCE
Refills: 0 | Status: COMPLETED | OUTPATIENT
Start: 2024-07-07 | End: 2024-07-07

## 2024-07-07 RX ORDER — CEFAZOLIN 10 G/1
INJECTION, POWDER, FOR SOLUTION INTRAVENOUS
Refills: 0 | Status: DISCONTINUED | OUTPATIENT
Start: 2024-07-07 | End: 2024-07-09

## 2024-07-07 RX ADMIN — CEFAZOLIN 100 MILLIGRAM(S): 10 INJECTION, POWDER, FOR SOLUTION INTRAVENOUS at 21:38

## 2024-07-07 RX ADMIN — DEXTROSE MONOHYDRATE AND SODIUM CHLORIDE 125 MILLILITER(S): 5; .3 INJECTION, SOLUTION INTRAVENOUS at 14:19

## 2024-07-07 RX ADMIN — PIPERACILLIN SODIUM AND TAZOBACTAM SODIUM 200 GRAM(S): 3; .375 INJECTION, POWDER, LYOPHILIZED, FOR SOLUTION INTRAVENOUS at 13:07

## 2024-07-07 RX ADMIN — Medication 150 MILLIGRAM(S): at 13:33

## 2024-07-07 RX ADMIN — CEFAZOLIN 100 MILLIGRAM(S): 10 INJECTION, POWDER, FOR SOLUTION INTRAVENOUS at 17:00

## 2024-07-07 NOTE — H&P ADULT - ASSESSMENT
63F with PMHX, HTN, renal carcinoma (s/p partial nephrectomy in 2016), laparoscopic cholecystectomy many years ago, s/p L breast lumpectomy with SLND with Dr. Lopez, and adjacent tissue transfer by PRS on 1/17/24, presenting with 1 week of L breast pain and worsening erythema. In ED, AF, hemodynamically wnl. US and CT significant for ~7cm multiseptated fluid collection in the left 7:00 breast at site of surgical scar.    PLAN:  - Admit to E Team under Dr. Lopez  - IR consult for drainage of abscess +/- drain placement  - Ancef for abx  - Holding DVT ppx  - Resume home meds  - F/u AM labs        E Team  [id number]

## 2024-07-07 NOTE — H&P ADULT - NSHPPHYSICALEXAM_GEN_ALL_CORE
General: NAD, resting comfortably in bed  HEENT: Grossly normal  Breast: L breast with healed surgical incision at 8'oclock position, indurated with palpable mass, ~10cm surrounding erythema, slighty tender to touch  Cardiac: RRR  Respiratory: Breathing comfortably on room air, no increased work of breathing  Abdomen: Nontender, nondistended, no masses palpated  Neuro: AOx3  Musculoskeletal: Moving all extremities spontaneously without limitations  Skin: Warm and dry

## 2024-07-07 NOTE — H&P ADULT - HISTORY OF PRESENT ILLNESS
63F with PMHX, HTN, renal carcinoma (s/p partial nephrectomy in 2016), laparoscopic cholecystectomy many years ago, s/p L breast lumpectomy with SLND with Dr. Lopez, and adjacent tissue transfer by PRS on 1/17/24, presenting with 1 week of L breast pain. Reports she noticed breast pain about one week ago and has been increasing in severity, especially starting three days ago. Also reports fevers to 100.4 three days ago, however notes that she was very warm from moving/hot weather at that time, fever resolved on its own without any medications. Overnight she noticed increased redness around the L breast, called Dr. Lopez's office. Placed ice packs on the breast and around the incision, however, pain and redness did not improve and patient presented to ED.     In ED, patient denies fevers, chills, SOB, n/v. Vitals AF, hemodynamically wnl.

## 2024-07-07 NOTE — ED PROVIDER NOTE - CLINICAL SUMMARY MEDICAL DECISION MAKING FREE TEXT BOX
64-year-old female with PMH of breast cancer (s/p L lumpectomy in january 2024) and renal carcinoma (s/p partial nephrectomy in 2016) p/w L breast pain x 1 week.     Consider post-op abscess vs serositis vs   Abscess more likely due size of induration and fever.

## 2024-07-07 NOTE — PATIENT PROFILE ADULT - NSPROPTRIGHTSUPPORTPERSON_GEN_A_NUR
[College] : College [Apartment] : [unfilled] lives in an apartment  [Central Forced Air] : heating provided by central forced air [Central] : air conditioning provided by central unit [Humidifier] : uses a humidifier [Dust Mite Covers] : has dust mite covers [Bedroom] :  in bedroom [Living Area] : in living area [None] : none [Single] : single [de-identified] : by self [FreeTextEntry1] : senior [Dehumidifier] : does not use a dehumidifier [Feather Pillows] : does not have feather pillows [Feather Comforter] : does not have a feather comforter [Smokers in Household] : there are no smokers in the home [de-identified] : sing,dance,cook,bake same name as above

## 2024-07-07 NOTE — ED PROVIDER NOTE - PHYSICAL EXAMINATION
Const: Awake, alert, no acute distress.  Well appearing.  Moving comfortably on stretcher.   Eyes: Extraocular movements intact b/l.    Cardiac: Regular rate and regular rhythm. S1 S2 present.   Resp: Speaking in full sentences. No evidence of respiratory distress.  Breath sounds clear to auscultation b/l. Normal chest excursion.   Skin: 6cm x 8cm area of induration and erythema in L breast, No rashes, abrasions or lacerations.  Neuro: Awake, alert & oriented x 3.  Moves all extremities spontaneously.  No focal deficits.

## 2024-07-07 NOTE — CONSULT NOTE ADULT - ASSESSMENT
-----------------------------------------------------------  Interventional Radiology Brief Consult Note  -----------------------------------------------------------    Reason for Referral: left breast collection aspiration vs drainage     Clinical Summary: 64y Female who is status post lumpectomy with sentinel lymph node dissection who is found to have a left breast collection that IR is consulted for drainage.      Vitals:  T(F): 98.5 (07-07-24 @ 15:17), Max: 98.8 (07-07-24 @ 13:16)  HR: 66 (07-07-24 @ 15:17) (66 - 75)  BP: 134/85 (07-07-24 @ 15:17) (121/73 - 134/85)  RR: 17 (07-07-24 @ 15:17) (16 - 17)  SpO2: 100% (07-07-24 @ 15:17) (94% - 100%)    Labs:           12.9  6.15)-----(297     (07-07-24 @ 10:59)         40.1     141 | 104 | 11  --------------------< 109     (07-07-24 @ 10:59)  3.7 | 25 | 0.73       PT: 12.7 07-07-24 @ 10:59  aPTT: 31.8 07-07-24 @ 10:59   INR: 1.13 07-07-24 @ 10:59    Imaging:  Most recent CT chest is reviewed     Assessment: 64y Female s/p left breast lumpectomy with sentinel lymph node dissection who is found to have a left breast collection that IR is consulted for drainage.     Recommendations:  - Plan for left breast collection aspiration vs drainage tomorrow. Please place IR procedure order under Dr. Martinez.   - Updated cbc, bmp and coags. NPO at midnight.   - Hold morning dose of ac.   - Write IR pre procedure note.     --  Flor Painting MD  Interventional Radiology Resident (PGY-6)  Available on [employer] TEAMS    For EMERGENT inquiries/questions:  IR Pager ([facility]): [phone]  IR Pager ([facility]): [phone] ; [phone]    For non-emergent consults/questions:   Please place a sunrise order "Consult- Interventional Radiology" with an appropriate callback number    For questions about scheduling during appropriate work hours, call IR :  [facility]: [phone]  [facility]: [phone]    For outpatient IR booking:  [facility]: [phone]  [facility]: [phone]

## 2024-07-07 NOTE — PATIENT PROFILE ADULT - ARE SIGNIFICANT INDICATORS COMPLETE.
Pt asking about having bw done  The last order was from 8/2018  Lab will not take them  Can you please re enter them and if you want additional enter those too  Please address   Call pt when ready No Yes

## 2024-07-07 NOTE — ED PROVIDER NOTE - OBJECTIVE STATEMENT
64-year-old female with past medical history of breast cancer and kidney cancer presents with left breast pain x 1 week.  Patient states she has had low-grade fevers of 100.4 x 3 days ago, She states she felt a small lump in her left breast that is pruritic and with increased warmth. 64-year-old female with PMH of breast cancer (s/p L lumpectomy in january 2024) and renal carcinoma (s/p partial nephrectomy in 2016) p/w L breast pain x 1 week.  Patient states she has had low-grade fevers of 100.4 x 3 days ago, She states she felt a small lump in her left breast that is pruritic and with increased warmth. Minimal pain with ROM. Patient had left-sided lumpectomy x 6 months ago after which she had 15 rounds of chemo, 5 days a week. Now she takes anastrozole daily.    Denies discharge, chest pain, shortness of breath, nausea, vomiting, abdominal pain, lightheadedness,

## 2024-07-07 NOTE — ED ADULT NURSE NOTE - OBJECTIVE STATEMENT
Pt received to room 18, A&Ox4, ambulatory. Pt presents to the ED today with left sided breast pain. Pt states having a lumpectomy in January 2024 and feeling increased discomfort in her left breast. Pt states noticing the discomfort when lifting her arm. Pt has positive ROM in her upper extremities. Pt also reports having fevers 100 degrees for the last 3 days. Upon assessment, the pt is afebrile. Pt is not diaphoretic at this time. Pt denies chest pain, SOB, abd pain, n/v/d, fever like symptoms, headaches, blurry vision. Respirations are even and unlabored. 20G IV placed in the right AC. Labs drawn and sent. Comfort measures provided and safety maintained. Plan of care ongoing. Pt received to room 18, A&Ox4, ambulatory. Pt presents to the ED today with left sided breast pain. Pt states having a lumpectomy in January 2024 and feeling increased discomfort in her left breast. Left side of her breast noted to be warm to touch. Pt states noticing the discomfort when lifting her arm. Pt has positive ROM in her upper extremities. Pt also reports having fevers 100 degrees for the last 3 days. Upon assessment, the pt is afebrile. Pt is not diaphoretic at this time. Pt denies chest pain, SOB, abd pain, n/v/d, fever like symptoms, headaches, blurry vision. PMH of breast cancer, pt not currently undergoing chemotherapy or radiation. Respirations are even and unlabored. 20G IV placed in the right AC. Labs drawn and sent. Comfort measures provided and safety maintained. Plan of care ongoing.

## 2024-07-07 NOTE — ED PROVIDER NOTE - PROGRESS NOTE DETAILS
Hugh Woods, PGY1    Surgery paged, Surgery familiar with patient, will come see patient. Hugh Woods, PGY1    Bedside breast POCUS done with attending and female chaperone. Cobblestoning and greater than 8cm of fluid seen.

## 2024-07-07 NOTE — ED ADULT TRIAGE NOTE - CHIEF COMPLAINT QUOTE
Phx s/p left lumpectomy with lymph nodes removed in Jan 2024. Pt taking Anastrozole daily. Phx kidney cancer with partial nephrectomy, HTN. C/o left breast pain, swelling and redness x 1 week. C/o low grade fevers 2 days ago.

## 2024-07-07 NOTE — ED PROVIDER NOTE - ATTENDING CONTRIBUTION TO CARE
64-year-old female with PMH of breast cancer (s/p L lumpectomy in january 2024) and renal carcinoma (s/p partial nephrectomy in 2016) p/w L breast pain x 1 week.  Patient states she has had low-grade fevers of 100.4 x 3 days ago, She states she felt a small lump in her left breast that is pruritic and with increased warmth. Minimal pain with ROM. Patient had left-sided lumpectomy x 6 months ago after which she had 15 rounds of chemo, 5 days a week. Now she takes anastrozole daily.    Denies discharge, chest pain, shortness of breath, nausea, vomiting, abdominal pain, lightheadedness,

## 2024-07-08 ENCOUNTER — RESULT REVIEW (OUTPATIENT)
Age: 64
End: 2024-07-08

## 2024-07-08 LAB
ANION GAP SERPL CALC-SCNC: 14 MMOL/L — SIGNIFICANT CHANGE UP (ref 7–14)
APTT BLD: 29.3 SEC — SIGNIFICANT CHANGE UP (ref 24.5–35.6)
BLD GP AB SCN SERPL QL: NEGATIVE — SIGNIFICANT CHANGE UP
BUN SERPL-MCNC: 8 MG/DL — SIGNIFICANT CHANGE UP (ref 7–23)
CALCIUM SERPL-MCNC: 8.8 MG/DL — SIGNIFICANT CHANGE UP (ref 8.4–10.5)
CHLORIDE SERPL-SCNC: 104 MMOL/L — SIGNIFICANT CHANGE UP (ref 98–107)
CO2 SERPL-SCNC: 23 MMOL/L — SIGNIFICANT CHANGE UP (ref 22–31)
CREAT SERPL-MCNC: 0.7 MG/DL — SIGNIFICANT CHANGE UP (ref 0.5–1.3)
EGFR: 95 ML/MIN/1.73M2 — SIGNIFICANT CHANGE UP
GLUCOSE SERPL-MCNC: 131 MG/DL — HIGH (ref 70–99)
HCT VFR BLD CALC: 36.2 % — SIGNIFICANT CHANGE UP (ref 34.5–45)
HGB BLD-MCNC: 11.6 G/DL — SIGNIFICANT CHANGE UP (ref 11.5–15.5)
INR BLD: 1.07 RATIO — SIGNIFICANT CHANGE UP (ref 0.85–1.18)
MAGNESIUM SERPL-MCNC: 1.9 MG/DL — SIGNIFICANT CHANGE UP (ref 1.6–2.6)
MCHC RBC-ENTMCNC: 25.2 PG — LOW (ref 27–34)
MCHC RBC-ENTMCNC: 32 GM/DL — SIGNIFICANT CHANGE UP (ref 32–36)
MCV RBC AUTO: 78.5 FL — LOW (ref 80–100)
NRBC # BLD: 0 /100 WBCS — SIGNIFICANT CHANGE UP (ref 0–0)
NRBC # FLD: 0 K/UL — SIGNIFICANT CHANGE UP (ref 0–0)
PHOSPHATE SERPL-MCNC: 3.4 MG/DL — SIGNIFICANT CHANGE UP (ref 2.5–4.5)
PLATELET # BLD AUTO: 282 K/UL — SIGNIFICANT CHANGE UP (ref 150–400)
POTASSIUM SERPL-MCNC: 3.5 MMOL/L — SIGNIFICANT CHANGE UP (ref 3.5–5.3)
POTASSIUM SERPL-SCNC: 3.5 MMOL/L — SIGNIFICANT CHANGE UP (ref 3.5–5.3)
PROTHROM AB SERPL-ACNC: 12 SEC — SIGNIFICANT CHANGE UP (ref 9.5–13)
RBC # BLD: 4.61 M/UL — SIGNIFICANT CHANGE UP (ref 3.8–5.2)
RBC # FLD: 14.8 % — HIGH (ref 10.3–14.5)
RH IG SCN BLD-IMP: POSITIVE — SIGNIFICANT CHANGE UP
SODIUM SERPL-SCNC: 141 MMOL/L — SIGNIFICANT CHANGE UP (ref 135–145)
WBC # BLD: 5.34 K/UL — SIGNIFICANT CHANGE UP (ref 3.8–10.5)
WBC # FLD AUTO: 5.34 K/UL — SIGNIFICANT CHANGE UP (ref 3.8–10.5)

## 2024-07-08 PROCEDURE — 76942 ECHO GUIDE FOR BIOPSY: CPT | Mod: 26

## 2024-07-08 PROCEDURE — 99231 SBSQ HOSP IP/OBS SF/LOW 25: CPT

## 2024-07-08 PROCEDURE — 10160 PNXR ASPIR ABSC HMTMA BULLA: CPT

## 2024-07-08 RX ORDER — HEPARIN SODIUM 50 [USP'U]/ML
5000 INJECTION, SOLUTION INTRAVENOUS EVERY 8 HOURS
Refills: 0 | Status: DISCONTINUED | OUTPATIENT
Start: 2024-07-08 | End: 2024-07-09

## 2024-07-08 RX ORDER — ANASTROZOLE 1 MG/1
1 TABLET ORAL DAILY
Refills: 0 | Status: DISCONTINUED | OUTPATIENT
Start: 2024-07-08 | End: 2024-07-08

## 2024-07-08 RX ORDER — POTASSIUM CHLORIDE 600 MG/1
40 TABLET, FILM COATED, EXTENDED RELEASE ORAL ONCE
Refills: 0 | Status: COMPLETED | OUTPATIENT
Start: 2024-07-08 | End: 2024-07-08

## 2024-07-08 RX ORDER — ANASTROZOLE 1 MG/1
1 TABLET ORAL DAILY
Refills: 0 | Status: DISCONTINUED | OUTPATIENT
Start: 2024-07-08 | End: 2024-07-09

## 2024-07-08 RX ADMIN — CEFAZOLIN 100 MILLIGRAM(S): 10 INJECTION, POWDER, FOR SOLUTION INTRAVENOUS at 05:28

## 2024-07-08 RX ADMIN — CEFAZOLIN 100 MILLIGRAM(S): 10 INJECTION, POWDER, FOR SOLUTION INTRAVENOUS at 21:09

## 2024-07-08 RX ADMIN — AMLODIPINE BESYLATE 5 MILLIGRAM(S): 2.5 TABLET ORAL at 05:27

## 2024-07-08 RX ADMIN — DEXTROSE MONOHYDRATE, SODIUM CHLORIDE, AND POTASSIUM CHLORIDE 125 MILLILITER(S): 50; 4.5; 2.24 INJECTION, SOLUTION INTRAVENOUS at 00:00

## 2024-07-08 RX ADMIN — HEPARIN SODIUM 5000 UNIT(S): 50 INJECTION, SOLUTION INTRAVENOUS at 21:53

## 2024-07-08 RX ADMIN — POTASSIUM CHLORIDE 40 MILLIEQUIVALENT(S): 600 TABLET, FILM COATED, EXTENDED RELEASE ORAL at 14:18

## 2024-07-08 RX ADMIN — ANASTROZOLE 1 MILLIGRAM(S): 1 TABLET ORAL at 11:22

## 2024-07-08 RX ADMIN — CEFAZOLIN 100 MILLIGRAM(S): 10 INJECTION, POWDER, FOR SOLUTION INTRAVENOUS at 14:22

## 2024-07-08 NOTE — CHART NOTE - NSCHARTNOTEFT_GEN_A_CORE
PRE-INTERVENTIONAL RADIOLOGY PROCEDURE NOTE      Patient Age: 64 year old    Patient Gender: female    Procedure: left breast collection aspiration vs drainage tomorrow    Diagnosis/Indication: fluid collection    Interventional Radiology Attending Physician: Juan    Ordering Attending Physician: John    Pertinent Medical History: hx partial nephrectomy, L breast lumpectomy    Pertinent labs:                      11.6   5.34  )-----------( 282      ( 08 Jul 2024 03:05 )             36.2       07-08    141  |  104  |  8   ----------------------------<  131<H>  3.5   |  23  |  0.70    Ca    8.8      08 Jul 2024 03:05  Phos  3.4     07-08  Mg     1.90     07-08    TPro  7.7  /  Alb  3.9  /  TBili  1.2  /  DBili  x   /  AST  56<H>  /  ALT  64<H>  /  AlkPhos  107  07-07      PT/INR - ( 08 Jul 2024 03:05 )   PT: 12.0 sec;   INR: 1.07 ratio         PTT - ( 08 Jul 2024 03:05 )  PTT:29.3 sec        Patient and Family Aware ? Yes
Post Operative Note  Patient: ELPIDIO PURI 64y (1960) Female   MRN: [id number]  Location: [facility]  Visit: 07-07-24 Inpatient  Date: 07-09-24 @ 00:58    Procedure: S/P left breast collection aspiration     Subjective: Patient seen and examined post operatively. Reports pain as controlled. Denies nausea, vomiting, fever, chills, chest pain, SOB, cough. Pt doing well with no concerns.       Objective:  Vitals: T(F): 99 (07-09-24 @ 00:13), Max: 99.8 (07-08-24 @ 05:10)  HR: 74 (07-09-24 @ 00:13)  BP: 132/65 (07-09-24 @ 00:13) (119/63 - 143/71)  RR: 17 (07-09-24 @ 00:13)  SpO2: 96% (07-09-24 @ 00:13)  Vent Settings:     In:   07-07-24 @ 07:01  -  07-08-24 @ 07:00  --------------------------------------------------------  IN: 1230 mL    07-08-24 @ 07:01  -  07-09-24 @ 00:58  --------------------------------------------------------  IN: 640 mL      IV Fluids:     Out:   07-07-24 @ 07:01  -  07-08-24 @ 07:00  --------------------------------------------------------  OUT: 800 mL    07-08-24 @ 07:01  -  07-09-24 @ 00:58  --------------------------------------------------------  OUT: 850 mL      EBL:     Voided Urine:   07-07-24 @ 07:01  -  07-08-24 @ 07:00  --------------------------------------------------------  OUT: 800 mL    07-08-24 @ 07:01  -  07-09-24 @ 00:58  --------------------------------------------------------  OUT: 850 mL      Baker Catheter: yes no   Drains:   SALLY:    ,   Chest Tube:      NG Tube:         Physical Examination:  General: NAD, resting in bed comfortably  Breast: mild breast tenderness, dressing clean and dry  Cardiac: regular rate, warm and well perfused  Respiratory: Nonlabored respirations, normal cw expansion.  Abdomen: soft, nontender, nondistended  Extremities: normal strength, FROM, no deformities    Imaging:  No post-op imaging studies    Assessment:  63 year old female with PMHX HTN, renal carcinoma s/p partial nephrectomy in 2016, laparoscopic cholecystectomy, L breast lumpectomy with SLND with Dr. Lopez and adjacent tissue transfer by PRS on 1/17/24, presenting with multiseptated fluid collection at L breast. Pt is now s/p L breast aspiration by IR, with successful collection of 200cc of yellow-tinged serous fluid. Pt is doing well with no concerns at this time.     Plan:  - IV Abx: ancef  - Pain control PRN  - Diet: regular  - Activity: OOB as tolerated  - DVT ppx: SQH q8

## 2024-07-08 NOTE — PROGRESS NOTE ADULT - SUBJECTIVE AND OBJECTIVE BOX
TEAM [ E ] Surgery Daily Progress Note  =====================================================    SUBJECTIVE: Patient seen and examined at bedside on AM rounds. Patient reports pain at L breast. Denies fever, chills, N/V, chest pain, SOB    ALLERGIES:  No Known Allergies    -------------------------------------------------------------------------------------    MEDICATIONS:  amLODIPine   Tablet 5 milliGRAM(s) Oral daily  ceFAZolin   IVPB      ceFAZolin   IVPB 2000 milliGRAM(s) IV Intermittent every 8 hours  dextrose 5% + sodium chloride 0.45% with potassium chloride 20 mEq/L 1000 milliLiter(s) IV Continuous <Continuous>    --------------------------------------------------------------------------------------    VITAL SIGNS:  T(C): 37.7 (07-08-24 @ 05:10), Max: 37.8 (07-08-24 @ 00:44)  HR: 75 (07-08-24 @ 05:10) (65 - 78)  BP: 135/65 (07-08-24 @ 05:10) (120/64 - 143/54)  RR: 18 (07-08-24 @ 05:10) (16 - 19)  SpO2: 95% (07-08-24 @ 05:10) (94% - 100%)  --------------------------------------------------------------------------------------    INS AND OUTS:    07-07-24 @ 07:01  -  07-08-24 @ 07:00  --------------------------------------------------------  IN: 1230 mL / OUT: 800 mL / NET: 430 mL      --------------------------------------------------------------------------------------      EXAM    General: NAD, resting in bed comfortably  Breast: L breast tenderness at 8oclock position  Cardiac: regular rate, warm and well perfused  Respiratory: Nonlabored respirations, normal cw expansion.  Abdomen: soft, nontender, nondistended  Extremities: normal strength, FROM, no deformities    --------------------------------------------------------------------------------------    LABS                        11.6   5.34  )-----------( 282      ( 08 Jul 2024 03:05 )             36.2   07-08    141  |  104  |  8   ----------------------------<  131<H>  3.5   |  23  |  0.70    Ca    8.8      08 Jul 2024 03:05  Phos  3.4     07-08  Mg     1.90     07-08    TPro  7.7  /  Alb  3.9  /  TBili  1.2  /  DBili  x   /  AST  56<H>  /  ALT  64<H>  /  AlkPhos  107  07-07

## 2024-07-08 NOTE — PRE PROCEDURE NOTE - PRE PROCEDURE EVALUATION
------------------------------------------------------------  Interventional Radiology Pre-Procedure Note  ------------------------------------------------------------    Indication: 64y Female who is status post lumpectomy with sentinel lymph node dissection who is found to have a left breast collection. Plan for left breast collection aspiration vs drainage.     Past Medical History:  Anxiety    Hypertension    Kidney tumor (benign), left    Renal angiomyolipoma    Renal mass, right    Morbidly obese    Malignant neoplasm of lower-inner quadrant of left female breast    Allergies: No Known Allergies      Medications:  amLODIPine   Tablet: 5 milliGRAM(s) Oral (07-08-24 @ 05:27)  ceFAZolin   IVPB: 100 mL/Hr IV Intermittent (07-08-24 @ 14:22)  ceFAZolin   IVPB: 100 mL/Hr IV Intermittent (07-07-24 @ 17:00)  fluconAZOLE   Tablet: 150 milliGRAM(s) Oral (07-07-24 @ 13:33)  piperacillin/tazobactam IVPB...: 200 mL/Hr IV Intermittent (07-07-24 @ 13:07)      Vital Signs:   T(F): 99.2 (16:25), Max: 100 (00:44)  HR: 75 (16:25)  BP: 133/65 (16:25)  RR: 18 (16:25)  SpO2: 97% (16:25)    Labs:           11.6  5.34)-----(282     (07-08-24 @ 03:05)         36.2     141 | 104 | 8  --------------------< 131     (07-08-24 @ 03:05)  3.5 | 23 | 0.70       PT: 12.0 07-08-24 @ 03:05  aPTT: 29.3 07-08-24 @ 03:05   INR: 1.07 07-08-24 @ 03:05    Imaging: CT chest 7/7/24 was reviewed     Consent: Risks/benefits/alternatives were explained and informed written consent was obtained.     Procedure Plan: Plan for left breast collection aspiration vs drainage today.

## 2024-07-08 NOTE — PROCEDURE NOTE - PROCEDURE FINDINGS AND DETAILS
Status post successful left breast collection aspiration with 200cc of yellow-tinged serous fluid. A sample was sent for gram stain and culture. Dry sterile dressing was applied, patient tolerated the procedure well and was transferred to the recovery area.

## 2024-07-09 ENCOUNTER — TRANSCRIPTION ENCOUNTER (OUTPATIENT)
Age: 64
End: 2024-07-09

## 2024-07-09 VITALS
TEMPERATURE: 99 F | RESPIRATION RATE: 18 BRPM | HEART RATE: 70 BPM | DIASTOLIC BLOOD PRESSURE: 69 MMHG | OXYGEN SATURATION: 97 % | SYSTOLIC BLOOD PRESSURE: 131 MMHG

## 2024-07-09 LAB
ANION GAP SERPL CALC-SCNC: 11 MMOL/L — SIGNIFICANT CHANGE UP (ref 7–14)
BUN SERPL-MCNC: 6 MG/DL — LOW (ref 7–23)
CALCIUM SERPL-MCNC: 9.3 MG/DL — SIGNIFICANT CHANGE UP (ref 8.4–10.5)
CHLORIDE SERPL-SCNC: 105 MMOL/L — SIGNIFICANT CHANGE UP (ref 98–107)
CO2 SERPL-SCNC: 25 MMOL/L — SIGNIFICANT CHANGE UP (ref 22–31)
CREAT SERPL-MCNC: 0.69 MG/DL — SIGNIFICANT CHANGE UP (ref 0.5–1.3)
EGFR: 96 ML/MIN/1.73M2 — SIGNIFICANT CHANGE UP
GLUCOSE SERPL-MCNC: 98 MG/DL — SIGNIFICANT CHANGE UP (ref 70–99)
GRAM STN FLD: SIGNIFICANT CHANGE UP
HCT VFR BLD CALC: 38.3 % — SIGNIFICANT CHANGE UP (ref 34.5–45)
HGB BLD-MCNC: 12.3 G/DL — SIGNIFICANT CHANGE UP (ref 11.5–15.5)
MAGNESIUM SERPL-MCNC: 1.9 MG/DL — SIGNIFICANT CHANGE UP (ref 1.6–2.6)
MCHC RBC-ENTMCNC: 25.6 PG — LOW (ref 27–34)
MCHC RBC-ENTMCNC: 32.1 GM/DL — SIGNIFICANT CHANGE UP (ref 32–36)
MCV RBC AUTO: 79.6 FL — LOW (ref 80–100)
NRBC # BLD: 0 /100 WBCS — SIGNIFICANT CHANGE UP (ref 0–0)
NRBC # FLD: 0 K/UL — SIGNIFICANT CHANGE UP (ref 0–0)
PHOSPHATE SERPL-MCNC: 3.9 MG/DL — SIGNIFICANT CHANGE UP (ref 2.5–4.5)
PLATELET # BLD AUTO: 317 K/UL — SIGNIFICANT CHANGE UP (ref 150–400)
POTASSIUM SERPL-MCNC: 3.8 MMOL/L — SIGNIFICANT CHANGE UP (ref 3.5–5.3)
POTASSIUM SERPL-SCNC: 3.8 MMOL/L — SIGNIFICANT CHANGE UP (ref 3.5–5.3)
RBC # BLD: 4.81 M/UL — SIGNIFICANT CHANGE UP (ref 3.8–5.2)
RBC # FLD: 14.7 % — HIGH (ref 10.3–14.5)
SODIUM SERPL-SCNC: 141 MMOL/L — SIGNIFICANT CHANGE UP (ref 135–145)
SPECIMEN SOURCE: SIGNIFICANT CHANGE UP
WBC # BLD: 4.07 K/UL — SIGNIFICANT CHANGE UP (ref 3.8–10.5)
WBC # FLD AUTO: 4.07 K/UL — SIGNIFICANT CHANGE UP (ref 3.8–10.5)

## 2024-07-09 PROCEDURE — 99231 SBSQ HOSP IP/OBS SF/LOW 25: CPT

## 2024-07-09 RX ORDER — CEPHALEXIN 500 MG
1 CAPSULE ORAL
Qty: 28 | Refills: 0
Start: 2024-07-09 | End: 2024-07-15

## 2024-07-09 RX ADMIN — AMLODIPINE BESYLATE 5 MILLIGRAM(S): 2.5 TABLET ORAL at 05:44

## 2024-07-09 RX ADMIN — CEFAZOLIN 100 MILLIGRAM(S): 10 INJECTION, POWDER, FOR SOLUTION INTRAVENOUS at 05:44

## 2024-07-09 RX ADMIN — HEPARIN SODIUM 5000 UNIT(S): 50 INJECTION, SOLUTION INTRAVENOUS at 05:47

## 2024-07-09 NOTE — DISCHARGE NOTE NURSING/CASE MANAGEMENT/SOCIAL WORK - PATIENT PORTAL LINK FT
You can access the FollowMyHealth Patient Portal offered by [facility] by registering at the following website: [email or web address]. By joining [employer]’s FollowMyHealth portal, you will also be able to view your health information using other applications (apps) compatible with our system.

## 2024-07-09 NOTE — DISCHARGE NOTE PROVIDER - NSDCMRMEDTOKEN_GEN_ALL_CORE_FT
amLODIPine 5 mg oral tablet: 1 tab(s) orally once a day (in the morning)  anastrozole 1 mg oral tablet: 1 tab(s) orally once a day   amLODIPine 5 mg oral tablet: 1 tab(s) orally once a day (in the morning)  anastrozole 1 mg oral tablet: 1 tab(s) orally once a day  cephalexin 500 mg oral tablet: 1 tab(s) orally every 6 hours

## 2024-07-09 NOTE — DISCHARGE NOTE PROVIDER - ATTENDING ATTESTATION STATEMENT
coughing I have personally seen and examined the patient. I have collaborated with and supervised the

## 2024-07-09 NOTE — PROGRESS NOTE ADULT - ASSESSMENT
63 year old female with PMHX HTN, renal carcinoma s/p partial nephrectomy in 2016, laparoscopic cholecystectomy, L breast lumpectomy with SLND with Dr. Lopez and adjacent tissue transfer by PRS on 1/17/24, presenting with multiseptated fluid collection at L breast surgical scar. 7/8 S/p IR aspiration of L breast collection    Plan  - Diet: regular  - Ancef q8 hours  - IVF dc  - Home amlodipine 5 mg daily and anastrozole  - DVT prophylaxis restarted  - Dispo planning    E team surgery [id number]  
63 year old female with PMHX HTN, renal carcinoma s/p partial nephrectomy in 2016, laparoscopic cholecystectomy, L breast lumpectomy with SLND with Dr. Lopez and adjacent tissue transfer by PRS on 1/17/24, presenting with multiseptated fluid collection at L breast surgical scar    Plan  - IR drainage vs aspiration today w Dr Martinez  - NPO after midnight  - Ancef q8 hours  - IVF  - Home amlodipine 5 mg daily  - Holding chemical DVT prophylaxis    E team surgery [id number]

## 2024-07-09 NOTE — DISCHARGE NOTE PROVIDER - HOSPITAL COURSE
63 year old female with PMHX HTN, renal carcinoma s/p partial nephrectomy in 2016, laparoscopic cholecystectomy, L breast lumpectomy with SLND with Dr. Lopez and adjacent tissue transfer by PRS on 1/17/24, presenting with multiseptated fluid collection at L breast surgical scar. 7/8 S/p IR aspiration of L breast collection  Patient tolerated operation well and there were no post-operative complications identified. Patient remained hemodynamically stable in the PACU and transferred to the surgical floor. Diet was restarted and advanced as tolerated. Pain control was transitioned from IV to PO pain meds. At this time, patient is currently ambulating, voiding, tolerating a regular diet. Patient has been deemed stable for discharge home with follow up as an outpatient.

## 2024-07-09 NOTE — DISCHARGE NOTE PROVIDER - CARE PROVIDER_API CALL
Pepe Lopez  Surgery  [address] [place] [id number]  Phone: [phone]  Fax: [phone]  Follow Up Time: 2 weeks

## 2024-07-09 NOTE — PROGRESS NOTE ADULT - SUBJECTIVE AND OBJECTIVE BOX
TEAM [ E ] Surgery Daily Progress Note  =====================================================    SUBJECTIVE: Patient seen and examined at bedside on AM rounds. Patient reports that they're feeling well. Denies fever, chills, N/V, chest pain, SOB    ALLERGIES:  No Known Allergies    -------------------------------------------------------------------------------------    MEDICATIONS:  amLODIPine   Tablet 5 milliGRAM(s) Oral daily  anastrozole 1 milliGRAM(s) Oral daily  ceFAZolin   IVPB      ceFAZolin   IVPB 2000 milliGRAM(s) IV Intermittent every 8 hours  heparin   Injectable 5000 Unit(s) SubCutaneous every 8 hours    --------------------------------------------------------------------------------------    VITAL SIGNS:  T(C): 36.9 (07-09-24 @ 05:38), Max: 37.6 (07-08-24 @ 20:12)  HR: 69 (07-09-24 @ 05:38) (69 - 78)  BP: 122/79 (07-09-24 @ 05:38) (119/63 - 143/71)  RR: 18 (07-09-24 @ 05:38) (17 - 20)  SpO2: 95% (07-09-24 @ 05:38) (95% - 98%)  --------------------------------------------------------------------------------------    INS AND OUTS:    07-07-24 @ 07:01  -  07-08-24 @ 07:00  --------------------------------------------------------  IN: 1230 mL / OUT: 800 mL / NET: 430 mL    07-08-24 @ 07:01  -  07-09-24 @ 05:44  --------------------------------------------------------  IN: 640 mL / OUT: 850 mL / NET: -210 mL      --------------------------------------------------------------------------------------      EXAM    General: NAD, resting in bed comfortably.  Cardiac: regular rate, warm and well perfused  Respiratory: Nonlabored respirations, normal cw expansion.  Abdomen: soft, nontender, nondistended  Extremities: normal strength, FROM, no deformities    --------------------------------------------------------------------------------------    LABS        Culture - Body Fluid with Gram Stain (collected 07-08-24 @ 17:00)  Source: .Body Fluid Left Breast Drainage  Gram Stain (07-09-24 @ 02:07):    polymorphonuclear leukocytes seen    No organisms seen    by cytocentrifuge     TEAM [ E ] Surgery Daily Progress Note  =====================================================    SUBJECTIVE: Patient seen and examined at bedside on AM rounds. Patient reports that they're feeling well. Denies fever, chills, N/V, chest pain, SOB    ALLERGIES:  No Known Allergies    -------------------------------------------------------------------------------------    MEDICATIONS:  amLODIPine   Tablet 5 milliGRAM(s) Oral daily  anastrozole 1 milliGRAM(s) Oral daily  ceFAZolin   IVPB      ceFAZolin   IVPB 2000 milliGRAM(s) IV Intermittent every 8 hours  heparin   Injectable 5000 Unit(s) SubCutaneous every 8 hours    --------------------------------------------------------------------------------------    VITAL SIGNS:  T(C): 36.9 (07-09-24 @ 05:38), Max: 37.6 (07-08-24 @ 20:12)  HR: 69 (07-09-24 @ 05:38) (69 - 78)  BP: 122/79 (07-09-24 @ 05:38) (119/63 - 143/71)  RR: 18 (07-09-24 @ 05:38) (17 - 20)  SpO2: 95% (07-09-24 @ 05:38) (95% - 98%)  --------------------------------------------------------------------------------------    INS AND OUTS:    07-07-24 @ 07:01  -  07-08-24 @ 07:00  --------------------------------------------------------  IN: 1230 mL / OUT: 800 mL / NET: 430 mL    07-08-24 @ 07:01  -  07-09-24 @ 05:44  --------------------------------------------------------  IN: 640 mL / OUT: 850 mL / NET: -210 mL      --------------------------------------------------------------------------------------      EXAM    General: NAD, resting in bed comfortably  Chest: L breast aspiration incision c/d/i, mild TTP at 8oclock position  Cardiac: regular rate, warm and well perfused  Respiratory: Nonlabored respirations, normal cw expansion.  Abdomen: soft, nontender, nondistended  Extremities: normal strength, FROM, no deformities    --------------------------------------------------------------------------------------    LABS                        11.6   5.34  )-----------( 282      ( 08 Jul 2024 03:05 )             36.2   07-08    141  |  104  |  8   ----------------------------<  131<H>  3.5   |  23  |  0.70    Ca    8.8      08 Jul 2024 03:05  Phos  3.4     07-08  Mg     1.90     07-08    TPro  7.7  /  Alb  3.9  /  TBili  1.2  /  DBili  x   /  AST  56<H>  /  ALT  64<H>  /  AlkPhos  107  07-07        Culture - Body Fluid with Gram Stain (collected 07-08-24 @ 17:00)  Source: .Body Fluid Left Breast Drainage  Gram Stain (07-09-24 @ 02:07):    polymorphonuclear leukocytes seen    No organisms seen    by cytocentrifuge

## 2024-07-09 NOTE — DISCHARGE NOTE PROVIDER - NSDCFUSCHEDAPPT_GEN_ALL_CORE_FT
[facility] Physician [facility]  BRSTIMAG [address] OP [address]  Scheduled Appointment: 08/05/2024    [facility]  ULTRASND  [facility] [place]  Scheduled Appointment: 08/05/2024    Tripp Ragland  [facility]  [place] [facility]  Scheduled Appointment: 08/12/2024

## 2024-07-09 NOTE — DISCHARGE NOTE NURSING/CASE MANAGEMENT/SOCIAL WORK - NSDCPNINST_GEN_ALL_CORE
Any temperature greater than 100.4, severe pain not relieved with medications or bleeding please call your surgeon. Continue to ambulate and use incentive spirometer.

## 2024-07-12 LAB
CULTURE RESULTS: SIGNIFICANT CHANGE UP
CULTURE RESULTS: SIGNIFICANT CHANGE UP
SPECIMEN SOURCE: SIGNIFICANT CHANGE UP
SPECIMEN SOURCE: SIGNIFICANT CHANGE UP

## 2024-07-13 LAB
CULTURE RESULTS: NO GROWTH — SIGNIFICANT CHANGE UP
SPECIMEN SOURCE: SIGNIFICANT CHANGE UP

## 2024-07-15 ENCOUNTER — RESULT REVIEW (OUTPATIENT)
Age: 64
End: 2024-07-15

## 2024-07-15 ENCOUNTER — APPOINTMENT (OUTPATIENT)
Dept: SURGICAL ONCOLOGY | Facility: CLINIC | Age: 64
End: 2024-07-15
Payer: MEDICARE

## 2024-07-15 ENCOUNTER — OUTPATIENT (OUTPATIENT)
Dept: OUTPATIENT SERVICES | Facility: HOSPITAL | Age: 64
LOS: 1 days | End: 2024-07-15
Payer: MEDICARE

## 2024-07-15 ENCOUNTER — APPOINTMENT (OUTPATIENT)
Dept: ULTRASOUND IMAGING | Facility: IMAGING CENTER | Age: 64
End: 2024-07-15
Payer: MEDICARE

## 2024-07-15 VITALS
BODY MASS INDEX: 47.8 KG/M2 | HEIGHT: 64 IN | SYSTOLIC BLOOD PRESSURE: 138 MMHG | OXYGEN SATURATION: 96 % | HEART RATE: 80 BPM | RESPIRATION RATE: 17 BRPM | WEIGHT: 280 LBS | DIASTOLIC BLOOD PRESSURE: 80 MMHG

## 2024-07-15 DIAGNOSIS — Z90.49 ACQUIRED ABSENCE OF OTHER SPECIFIED PARTS OF DIGESTIVE TRACT: Chronic | ICD-10-CM

## 2024-07-15 DIAGNOSIS — C50.312 MALIGNANT NEOPLASM OF LOWER-INNER QUADRANT OF LEFT FEMALE BREAST: ICD-10-CM

## 2024-07-15 DIAGNOSIS — Z90.89 ACQUIRED ABSENCE OF OTHER ORGANS: Chronic | ICD-10-CM

## 2024-07-15 PROCEDURE — 99215 OFFICE O/P EST HI 40 MIN: CPT

## 2024-07-15 PROCEDURE — 76642 ULTRASOUND BREAST LIMITED: CPT | Mod: 26,LT

## 2024-07-15 PROCEDURE — 76642 ULTRASOUND BREAST LIMITED: CPT

## 2024-07-19 ENCOUNTER — RESULT REVIEW (OUTPATIENT)
Age: 64
End: 2024-07-19

## 2024-07-19 ENCOUNTER — APPOINTMENT (OUTPATIENT)
Dept: ULTRASOUND IMAGING | Facility: IMAGING CENTER | Age: 64
End: 2024-07-19
Payer: MEDICARE

## 2024-07-19 ENCOUNTER — OUTPATIENT (OUTPATIENT)
Dept: OUTPATIENT SERVICES | Facility: HOSPITAL | Age: 64
LOS: 1 days | End: 2024-07-19
Payer: MEDICARE

## 2024-07-19 DIAGNOSIS — Z90.49 ACQUIRED ABSENCE OF OTHER SPECIFIED PARTS OF DIGESTIVE TRACT: Chronic | ICD-10-CM

## 2024-07-19 DIAGNOSIS — D30.02 BENIGN NEOPLASM OF LEFT KIDNEY: Chronic | ICD-10-CM

## 2024-07-19 DIAGNOSIS — Z90.89 ACQUIRED ABSENCE OF OTHER ORGANS: Chronic | ICD-10-CM

## 2024-07-19 DIAGNOSIS — C50.312 MALIGNANT NEOPLASM OF LOWER-INNER QUADRANT OF LEFT FEMALE BREAST: ICD-10-CM

## 2024-07-19 PROCEDURE — 88173 CYTOPATH EVAL FNA REPORT: CPT | Mod: 26

## 2024-07-19 PROCEDURE — 88173 CYTOPATH EVAL FNA REPORT: CPT

## 2024-07-19 PROCEDURE — 10005 FNA BX W/US GDN 1ST LES: CPT

## 2024-07-19 PROCEDURE — 88305 TISSUE EXAM BY PATHOLOGIST: CPT

## 2024-07-19 PROCEDURE — 88305 TISSUE EXAM BY PATHOLOGIST: CPT | Mod: 26

## 2024-07-19 PROCEDURE — 10005 FNA BX W/US GDN 1ST LES: CPT | Mod: LT

## 2024-07-22 ENCOUNTER — APPOINTMENT (OUTPATIENT)
Dept: SURGICAL ONCOLOGY | Facility: CLINIC | Age: 64
End: 2024-07-22
Payer: MEDICARE

## 2024-07-22 VITALS
HEIGHT: 64 IN | WEIGHT: 280 LBS | SYSTOLIC BLOOD PRESSURE: 150 MMHG | HEART RATE: 76 BPM | BODY MASS INDEX: 47.8 KG/M2 | OXYGEN SATURATION: 96 % | DIASTOLIC BLOOD PRESSURE: 90 MMHG

## 2024-07-22 PROCEDURE — 99214 OFFICE O/P EST MOD 30 MIN: CPT

## 2024-07-23 LAB — NON-GYNECOLOGICAL CYTOLOGY STUDY: SIGNIFICANT CHANGE UP

## 2024-07-23 NOTE — ASSESSMENT
[FreeTextEntry1] : 64-year-old lady.  Admitted 7/7/2024 for 48 hours to [facility] with left breast pain, skin edema, Had percutaneous aspiration of the fluid by interventional radiology. Culture results negative. Discharged home on oral antibiotics.  Second office visit since above episode.  7/15/2024: Left breast sonogram at 450: BI-RADS 2. Persistent fluid collection in the left breast at 7:00. 7/19/2024: 85 cc of thin clear yellow fluid aspirated by radiology under ultrasound guidance. Microbiology and cytopathology are pending.  She is doing much better. I should see her in another 2 weeks, sooner if needed.  August 20, 2024, she is going on a 1-week cruise.    January 2024: Left breast conserving surgery after a 3-month course of neoadjuvant anastrozole.  Started radiation therapy February 2024.  Remains on anastrozole.  Follow-up CT scan of the chest is due May 2025. Prescription entered/provided at her June 2024 visit.  Annual mammogram should be August 2024. Prescription entered/provided after June 2024 visit.

## 2024-07-23 NOTE — ASSESSMENT
[FreeTextEntry1] : 64-year-old lady.  Admitted 7/7/2024 for 48 hours to [facility] with left breast pain, skin edema, Had percutaneous aspiration of the fluid by interventional radiology. Culture results negative. Discharged home on oral antibiotics.  Second office visit since above episode.  7/15/2024: Left breast sonogram at 450: BI-RADS 2. Persistent fluid collection in the left breast at 7:00. 7/19/2024: 85 cc of thin clear yellow fluid aspirated by radiology under ultrasound guidance. Microbiology and cytopathology are pending.  She is doing much better. I should see her in another 2 weeks, sooner if needed.  August 20, 2024, she is going on a 1-week cruise.    January 2024: Left breast conserving surgery after a 3-month course of neoadjuvant anastrozole.  Started radiation therapy February 2024.  Remains on anastrozole.  Follow-up CT scan of the chest is due May 2025. Prescription entered/provided at her June 2024 visit.  Annual mammogram should be August 2024. Prescription entered/provided after June 2024 visit.

## 2024-07-23 NOTE — REASON FOR VISIT
[Follow-Up Visit] : a follow-up visit for [Other: _____] : [unfilled] [FreeTextEntry2] : Second office visit for a nonsuppurative infection in her conservative left breast which required hospitalization in the first week of July 2024.

## 2024-07-23 NOTE — PHYSICAL EXAM
[Normal] : supple, no neck mass and thyroid not enlarged [Normal Neck Lymph Nodes] : normal neck lymph nodes  [Normal Supraclavicular Lymph Nodes] : normal supraclavicular lymph nodes [Normal Axillary Lymph Nodes] : normal axillary lymph nodes [Normal] : normal appearance, no rash, nodules, vesicles, ulcers, erythema [de-identified] : Groins not examined [de-identified] : Below

## 2024-07-23 NOTE — REVIEW OF SYSTEMS
[Negative] : Endocrine [FreeTextEntry5] : Hypertension [FreeTextEntry7] : NKDA [FreeTextEntry9] : Kidney cancer [FreeTextEntry1] : Breast cancer

## 2024-07-23 NOTE — PHYSICAL EXAM
[Normal] : supple, no neck mass and thyroid not enlarged [Normal Neck Lymph Nodes] : normal neck lymph nodes  [Normal Supraclavicular Lymph Nodes] : normal supraclavicular lymph nodes [Normal Axillary Lymph Nodes] : normal axillary lymph nodes [Normal] : normal appearance, no rash, nodules, vesicles, ulcers, erythema [de-identified] : Groins not examined [de-identified] : Below

## 2024-07-23 NOTE — HISTORY OF PRESENT ILLNESS
[de-identified] : 63-year-old lady who previously had left breast conserving surgery after neoadjuvant systemic treatment (hormonal), which was followed by adjuvant radiation.  24: Admitted to [facility] with a left breast infection. Prodrome consisted of a 1-week history of left breast pain which is gradually increased. Fever 100.4 F at home. + Associated erythema. No leukocytosis on admission.  24: Left breast sonogram: 7.3 cm septated fluid collection at 7:00, deep to the surgical scar.  2024: CT chest: Left breast skin thickening with infiltration of subcutaneous fat and an underlying 7.6 x 7.1 cm rim-enhancing fluid collection.  2024: Image guided aspiration and radiology: Gram stain: + PMNs. No organisms seen. Culture results: No growth  During the hospitalization she was treated with intravenous cefazolin.  She was discharged home on the same medication. At her July 15, 2024, office visit with me she was feeling better. Local symptoms had resolved. No constitutional signs or symptoms.   She presents for Scheduled follow-up.  CC: She feels much better. No fever, sweats, chills. Pain/tenderness is decreased in the left breast. No other signs or symptoms related to either breast, or any constitutional complaints.  7/15/2024: Follow-up left breast sonogram at 450: BI-RADS 2. Fluid collection seen at 7:00 of the conserved left breast.  2024: Sonogram guided aspiration of left breast complicated cystic fluid collection at 450: 85 cc of thin yellow fluid aspirated. Cytology and microbiology are pending.  She has completed her course of oral antibiotics.   Tyrer-Cuzick risk score = 8.1.   2024: LEFT BREAST conserving surgery with oncoplastic closure by Dr. Ashok VINCENT, after a 3-month course of neoadjuvant anastrozole. Surgical pathology: T = 3.9 cm, negative margins. 0/3 left axillary LN.  + ER/LA.  2024: Commenced radiation therapy. Completed 3/11/2024. Radiation oncology: Dr. Jasmin ESTES.  She has remained on anastrozole. Medical oncology: Dr. Tripp GAGNON.   2023: Referred with a ~4.4 cm invasive cancer of her LEFT BREAST (LIQ). This was an asymptomatic nonpalpable finding identified in the medial left breast on CT scan of the chest performed for lung cancer screening.   2023: Preoperative breast MRI: BI-RADS 4. + Additional ipsilateral (left breast) anterior to the known malignancy. Subsequent MRI guided core biopsy was benign and concordant.  2023: Initiated neoadjuvant systemic therapy with anastrozole. Medical oncology: Dr. Tripp GAGNON.   Genetic testing ([employer]) 84 gene assay: Negative 2023: VUS in RET.   Remainder of her extent of disease evaluation: May 2023: CT abdomen and pelvis was unremarkable. 2023 CT chest: Equivocal findings. 2023: Follow-up CT chest: No interval changes. May 2024: Follow-up CT chest system: No interval changes. Annual follow-up recommended for May 2025. Prescription entered/provided at her 2024 visit.   No previous personal history of breast disease. No prior breast biopsies.   + Prior personal history of malignancy: 2016 (she was 56 years old) right partial nephrectomy for RIGHT KIDNEY CANCER (RCCA). No adjuvant therapy required. Continues to follow-up with her urologist: Dr. Praveen VERGARA.  No other personal history of malignancy.   + FH: Maternal cousin: Breast cancer. Paternal cousin also had breast cancer. Not sure if either one of them had genetic testing.  A maternal aunt may have had ovarian cancer, our patient is not certain.  Not Ashkenazi.  + Additional family history of malignancy: Brother: Lung cancer. A paternal uncle also had lung cancer.   Menarche at 9.  1, para 1 at 36. Natural menopause at 48. No HRT   PMD: Dr. Gabe SERRATO.  NKDA.  No pacemaker or defibrillator. No anticoagulants  + Hypertension. Treated with amlodipine. She does not see a cardiologist.  + Daily anastrozole. Started (neoadjuvant) 2023 after the breast cancer diagnosis.  + History of right renal cell cancer (RCCA). Right partial nephrectomy in . + Left renal angiolipoma. Urology: Dr. Praveen VERGARA  + Ex-smoker. Pulmonary: THERESE Herman with the [facility].   She has not seen a gynecologist since age 38.   She has never had a colonoscopy.

## 2024-08-04 ENCOUNTER — NON-APPOINTMENT (OUTPATIENT)
Age: 64
End: 2024-08-04

## 2024-08-05 ENCOUNTER — RX RENEWAL (OUTPATIENT)
Age: 64
End: 2024-08-05

## 2024-08-05 ENCOUNTER — APPOINTMENT (OUTPATIENT)
Dept: SURGICAL ONCOLOGY | Facility: CLINIC | Age: 64
End: 2024-08-05

## 2024-08-05 PROCEDURE — 99214 OFFICE O/P EST MOD 30 MIN: CPT

## 2024-08-06 NOTE — ASSESSMENT
[FreeTextEntry1] : 64-year-old lady.  Admitted 7/7/2024 for 48 hours to [facility] with left breast pain, skin edema, Had percutaneous aspiration of the fluid by interventional radiology. Culture results negative. Discharged home on oral antibiotics.  Third office visit since above episode.  No fever, sweats, or chills. The erythema and induration continue to subside.  She will be going away August 19 and will see me before then. Also, for her own peace of mind she would like to have a repeat ultrasound of the left breast to make sure there is no re-accumulation of fluid, or any other worrisome finding. Prescription entered/provided.   7/15/2024: Left breast sonogram at 450: BI-RADS 2. Persistent fluid collection in the left breast at 7:00. 7/19/2024: 85 cc of thin clear yellow fluid aspirated by radiology under ultrasound guidance. Cytology: No malignant cells. Gram stain: + PMNs, no organisms.  She is doing much better. I should see her in another month, sooner if needed.  August 20, 2024, she is going on a 1-week cruise.    January 2024: Left breast conserving surgery after a 3-month course of neoadjuvant anastrozole.  Started radiation therapy February 2024.  Remains on anastrozole.  Follow-up CT scan of the chest is due May 2025. Prescription entered/provided at her June 2024 visit.  Annual mammogram should be August 2024. Prescription entered/provided after June 2024 visit.

## 2024-08-06 NOTE — HISTORY OF PRESENT ILLNESS
[de-identified] : 64-year-old lady who had left breast conserving surgery after neoadjuvant systemic treatment (hormonal), which was followed by adjuvant radiation.   She woke up this morning (2024) with a swollen right eyelid. It is not particularly uncomfortable. She has an appointment with ophthalmology later today: Dr. Joaquim GONZALEZ.  24: Admitted to [facility] with a left breast infection. Prodrome consisted of a 1-week history of left breast pain which is gradually increased. Fever 100.4 F at home. + Associated erythema. No leukocytosis on admission.  24: Left breast sonogram: 7.3 cm septated fluid collection at 7:00, deep to the surgical scar.  2024: CT chest: Left breast skin thickening with infiltration of subcutaneous fat and an underlying 7.6 x 7.1 cm rim-enhancing fluid collection.  2024: Image guided aspiration and radiology: Gram stain: + PMNs. No organisms seen. Culture results: No growth  During the hospitalization she was treated with intravenous cefazolin.  She was discharged home on the same medication. At her July 15, 2024, office visit with me she was feeling better. Local symptoms had resolved. No constitutional signs or symptoms.   She presents for Scheduled follow-up.  CC: She feels much better. No fever, sweats, chills. Pain/tenderness is decreased in the left breast. No other signs or symptoms related to either breast, or any constitutional complaints.  7/15/2024: Follow-up left breast sonogram at 450: BI-RADS 2. Fluid collection seen at 7:00 of the conserved left breast.  2024: Sonogram guided aspiration of left breast complicated cystic fluid collection at 450: 85 cc of thin yellow fluid aspirated. Cytology: Negative for malignant cells. Gram stain: + PMNs, no organisms.  She has completed her course of oral antibiotics.   Tyrer-Cuzick risk score = 8.1.   2024: LEFT BREAST conserving surgery with oncoplastic closure by Dr. Ashok VINCENT, after a 3-month course of neoadjuvant anastrozole. Surgical pathology: T = 3.9 cm, negative margins. 0/3 left axillary LN.  + ER/[place].  2024: Commenced radiation therapy. Completed 3/11/2024. Radiation oncology: Dr. Jasmin ESTES.  She has remained on anastrozole. Medical oncology: Dr. Tripp GAGNON.   2023: Referred with a ~4.4 cm invasive cancer of her LEFT BREAST (LIQ). This was an asymptomatic nonpalpable finding identified in the medial left breast on CT scan of the chest performed for lung cancer screening.   2023: Preoperative breast MRI: BI-RADS 4. + Additional ipsilateral (left breast) anterior to the known malignancy. Subsequent MRI guided core biopsy was benign and concordant.  2023: Initiated neoadjuvant systemic therapy with anastrozole. Medical oncology: Dr. Tripp GAGNON.   Genetic testing ([employer]) 84 gene assay: Negative 2023: VUS in RET.   Remainder of her extent of disease evaluation: May 2023: CT abdomen and pelvis was unremarkable. 2023 CT chest: Equivocal findings. 2023: Follow-up CT chest: No interval changes. May 2024: Follow-up CT chest system: No interval changes. Annual follow-up recommended for May 2025. Prescription entered/provided at her 2024 visit.   No previous personal history of breast disease. No prior breast biopsies.   + Prior personal history of malignancy: 2016 (she was 56 years old) right partial nephrectomy for RIGHT KIDNEY CANCER (RCCA). No adjuvant therapy required. Continues to follow-up with her urologist: Dr. Praveen VERGARA.  No other personal history of malignancy.   + FH: Maternal cousin: Breast cancer. Paternal cousin also had breast cancer. Not sure if either one of them had genetic testing.  A maternal aunt may have had ovarian cancer, our patient is not certain.  Not Ashkenazi.  + Additional family history of malignancy: Brother: Lung cancer. A paternal uncle also had lung cancer.   Menarche at 9.  1, para 1 at 36. Natural menopause at 48. No HRT   PMD: Dr. Gabe SERRATO.  NKDA.  No pacemaker or defibrillator. No anticoagulants  + Hypertension. Treated with amlodipine. She does not see a cardiologist.  + Daily anastrozole. Started (neoadjuvant) 2023 after the breast cancer diagnosis.  + History of right renal cell cancer (RCCA). Right partial nephrectomy in . + Left renal angiolipoma. Urology: Dr. Praveen VERGARA  + Ex-smoker. Pulmonary: THERESE Herman with the health system.   She has not seen a gynecologist since age 38.   She has never had a colonoscopy.

## 2024-08-06 NOTE — REASON FOR VISIT
[FreeTextEntry2] : Third office visit after a nonsuppurative infection of her conserved left breast, which had required hospitalization and intravenous antibiotics in the first week of July 2024.

## 2024-08-06 NOTE — ASSESSMENT
[FreeTextEntry1] : 64-year-old lady.  Admitted 7/7/2024 for 48 hours to [facility] with left breast pain, skin edema, Had percutaneous aspiration of the fluid by interventional radiology. Culture results negative. Discharged home on oral antibiotics.  Third office visit since above episode.  No fever, sweats, or chills. The erythema and induration continue to subside.  She will be going away August 19 and will see me before then. Also, for her own peace of mind she would like to have a repeat ultrasound of the left breast to make sure there is no re-accumulation of fluid, or any other worrisome finding. Prescription entered/provided.   7/15/2024: Left breast sonogram at 450: BI-RADS 2. Persistent fluid collection in the left breast at 7:00. 7/19/2024: 85 cc of thin clear yellow fluid aspirated by radiology under ultrasound guidance. Cytology: No malignant cells. Gram stain: + PMNs, no organisms.  She is doing much better. I should see her in another month, sooner if needed.  August 20, 2024, she is going on a 1-week cruise.    January 2024: Left breast conserving surgery after a 3-month course of neoadjuvant anastrozole.  Started radiation therapy February 2024.  Remains on anastrozole.  Follow-up CT scan of the chest is due May 2025. Prescription entered/provided at her June 2024 visit.  Annual mammogram should be August 2024. Prescription entered/provided after June 2024 visit.

## 2024-08-06 NOTE — REVIEW OF SYSTEMS
[FreeTextEntry6] : Ex-smoker [FreeTextEntry7] : NKDA [FreeTextEntry9] : Kidney cancer [FreeTextEntry1] : Breast cancer

## 2024-08-06 NOTE — HISTORY OF PRESENT ILLNESS
[de-identified] : 64-year-old lady who had left breast conserving surgery after neoadjuvant systemic treatment (hormonal), which was followed by adjuvant radiation.   She woke up this morning (2024) with a swollen right eyelid. It is not particularly uncomfortable. She has an appointment with ophthalmology later today: Dr. Joaquim GONZALEZ.  24: Admitted to [facility] with a left breast infection. Prodrome consisted of a 1-week history of left breast pain which is gradually increased. Fever 100.4 F at home. + Associated erythema. No leukocytosis on admission.  24: Left breast sonogram: 7.3 cm septated fluid collection at 7:00, deep to the surgical scar.  2024: CT chest: Left breast skin thickening with infiltration of subcutaneous fat and an underlying 7.6 x 7.1 cm rim-enhancing fluid collection.  2024: Image guided aspiration and radiology: Gram stain: + PMNs. No organisms seen. Culture results: No growth  During the hospitalization she was treated with intravenous cefazolin.  She was discharged home on the same medication. At her July 15, 2024, office visit with me she was feeling better. Local symptoms had resolved. No constitutional signs or symptoms.   She presents for Scheduled follow-up.  CC: She feels much better. No fever, sweats, chills. Pain/tenderness is decreased in the left breast. No other signs or symptoms related to either breast, or any constitutional complaints.  7/15/2024: Follow-up left breast sonogram at 450: BI-RADS 2. Fluid collection seen at 7:00 of the conserved left breast.  2024: Sonogram guided aspiration of left breast complicated cystic fluid collection at 450: 85 cc of thin yellow fluid aspirated. Cytology: Negative for malignant cells. Gram stain: + PMNs, no organisms.  She has completed her course of oral antibiotics.   Tyrer-Cuzick risk score = 8.1.   2024: LEFT BREAST conserving surgery with oncoplastic closure by Dr. Ashok VINCENT, after a 3-month course of neoadjuvant anastrozole. Surgical pathology: T = 3.9 cm, negative margins. 0/3 left axillary LN.  + ER/MS.  2024: Commenced radiation therapy. Completed 3/11/2024. Radiation oncology: Dr. Jasmin ESTES.  She has remained on anastrozole. Medical oncology: Dr. Tripp GAGNON.   2023: Referred with a ~4.4 cm invasive cancer of her LEFT BREAST (LIQ). This was an asymptomatic nonpalpable finding identified in the medial left breast on CT scan of the chest performed for lung cancer screening.   2023: Preoperative breast MRI: BI-RADS 4. + Additional ipsilateral (left breast) anterior to the known malignancy. Subsequent MRI guided core biopsy was benign and concordant.  2023: Initiated neoadjuvant systemic therapy with anastrozole. Medical oncology: Dr. Tripp GAGNON.   Genetic testing ([employer]) 84 gene assay: Negative 2023: VUS in RET.   Remainder of her extent of disease evaluation: May 2023: CT abdomen and pelvis was unremarkable. 2023 CT chest: Equivocal findings. 2023: Follow-up CT chest: No interval changes. May 2024: Follow-up CT chest system: No interval changes. Annual follow-up recommended for May 2025. Prescription entered/provided at her 2024 visit.   No previous personal history of breast disease. No prior breast biopsies.   + Prior personal history of malignancy: 2016 (she was 56 years old) right partial nephrectomy for RIGHT KIDNEY CANCER (RCCA). No adjuvant therapy required. Continues to follow-up with her urologist: Dr. Praveen VERGARA.  No other personal history of malignancy.   + FH: Maternal cousin: Breast cancer. Paternal cousin also had breast cancer. Not sure if either one of them had genetic testing.  A maternal aunt may have had ovarian cancer, our patient is not certain.  Not Ashkenazi.  + Additional family history of malignancy: Brother: Lung cancer. A paternal uncle also had lung cancer.   Menarche at 9.  1, para 1 at 36. Natural menopause at 48. No HRT   PMD: Dr. Gabe SERRATO.  NKDA.  No pacemaker or defibrillator. No anticoagulants  + Hypertension. Treated with amlodipine. She does not see a cardiologist.  + Daily anastrozole. Started (neoadjuvant) 2023 after the breast cancer diagnosis.  + History of right renal cell cancer (RCCA). Right partial nephrectomy in . + Left renal angiolipoma. Urology: Dr. Praveen VERGARA  + Ex-smoker. Pulmonary: THERESE Herman with the health system.   She has not seen a gynecologist since age 38.   She has never had a colonoscopy.

## 2024-08-07 ENCOUNTER — OUTPATIENT (OUTPATIENT)
Dept: OUTPATIENT SERVICES | Facility: HOSPITAL | Age: 64
LOS: 1 days | Discharge: HOME | End: 2024-08-07

## 2024-08-07 DIAGNOSIS — Z90.89 ACQUIRED ABSENCE OF OTHER ORGANS: Chronic | ICD-10-CM

## 2024-08-07 DIAGNOSIS — C44.191 OTHER SPECIFIED MALIGNANT NEOPLASM OF SKIN OF UNSPECIFIED EYELID, INCLUDING CANTHUS: ICD-10-CM

## 2024-08-07 DIAGNOSIS — D30.02 BENIGN NEOPLASM OF LEFT KIDNEY: Chronic | ICD-10-CM

## 2024-08-07 DIAGNOSIS — Z90.49 ACQUIRED ABSENCE OF OTHER SPECIFIED PARTS OF DIGESTIVE TRACT: Chronic | ICD-10-CM

## 2024-08-12 ENCOUNTER — RESULT REVIEW (OUTPATIENT)
Age: 64
End: 2024-08-12

## 2024-08-12 ENCOUNTER — APPOINTMENT (OUTPATIENT)
Dept: HEMATOLOGY ONCOLOGY | Facility: CLINIC | Age: 64
End: 2024-08-12
Payer: MEDICARE

## 2024-08-12 LAB
BASOPHILS # BLD AUTO: 0.06 K/UL — SIGNIFICANT CHANGE UP (ref 0–0.2)
BASOPHILS NFR BLD AUTO: 1 % — SIGNIFICANT CHANGE UP (ref 0–2)
EOSINOPHIL # BLD AUTO: 0.16 K/UL — SIGNIFICANT CHANGE UP (ref 0–0.5)
EOSINOPHIL NFR BLD AUTO: 2.6 % — SIGNIFICANT CHANGE UP (ref 0–6)
HCT VFR BLD CALC: 43.2 % — SIGNIFICANT CHANGE UP (ref 34.5–45)
HGB BLD-MCNC: 13.5 G/DL — SIGNIFICANT CHANGE UP (ref 11.5–15.5)
IMM GRANULOCYTES NFR BLD AUTO: 0.3 % — SIGNIFICANT CHANGE UP (ref 0–0.9)
LYMPHOCYTES # BLD AUTO: 1.68 K/UL — SIGNIFICANT CHANGE UP (ref 1–3.3)
LYMPHOCYTES # BLD AUTO: 27.1 % — SIGNIFICANT CHANGE UP (ref 13–44)
MCHC RBC-ENTMCNC: 25 PG — LOW (ref 27–34)
MCHC RBC-ENTMCNC: 31.3 G/DL — LOW (ref 32–36)
MCV RBC AUTO: 80.1 FL — SIGNIFICANT CHANGE UP (ref 80–100)
MONOCYTES # BLD AUTO: 0.45 K/UL — SIGNIFICANT CHANGE UP (ref 0–0.9)
MONOCYTES NFR BLD AUTO: 7.3 % — SIGNIFICANT CHANGE UP (ref 2–14)
NEUTROPHILS # BLD AUTO: 3.83 K/UL — SIGNIFICANT CHANGE UP (ref 1.8–7.4)
NEUTROPHILS NFR BLD AUTO: 61.7 % — SIGNIFICANT CHANGE UP (ref 43–77)
NRBC # BLD: 0 /100 WBCS — SIGNIFICANT CHANGE UP (ref 0–0)
NRBC BLD-RTO: 0 /100 WBCS — SIGNIFICANT CHANGE UP (ref 0–0)
PLATELET # BLD AUTO: 325 K/UL — SIGNIFICANT CHANGE UP (ref 150–400)
RBC # BLD: 5.39 M/UL — HIGH (ref 3.8–5.2)
RBC # FLD: 15.1 % — HIGH (ref 10.3–14.5)
WBC # BLD: 6.2 K/UL — SIGNIFICANT CHANGE UP (ref 3.8–10.5)
WBC # FLD AUTO: 6.2 K/UL — SIGNIFICANT CHANGE UP (ref 3.8–10.5)

## 2024-08-12 PROCEDURE — G2211 COMPLEX E/M VISIT ADD ON: CPT

## 2024-08-12 PROCEDURE — 99214 OFFICE O/P EST MOD 30 MIN: CPT

## 2024-08-12 NOTE — BEGINNING OF VISIT
[0] : 2) Feeling down, depressed, or hopeless: Not at all (0) [Pain Scale: ___] : On a scale of 1-10, today the patient's pain is a(n) [unfilled]. [Former] : Former [Date Discussed (MM/DD/YY): ___] : Discussed: [unfilled] [Reviewed, no changes] : Reviewed, no changes

## 2024-08-15 LAB
25(OH)D3 SERPL-MCNC: 19.5 NG/ML
ALBUMIN SERPL ELPH-MCNC: 4.4 G/DL
ALP BLD-CCNC: 101 U/L
ALT SERPL-CCNC: 11 U/L
ANION GAP SERPL CALC-SCNC: 12 MMOL/L
AST SERPL-CCNC: 12 U/L
BILIRUB SERPL-MCNC: 0.3 MG/DL
BUN SERPL-MCNC: 12 MG/DL
CALCIUM SERPL-MCNC: 9.7 MG/DL
CHLORIDE SERPL-SCNC: 107 MMOL/L
CO2 SERPL-SCNC: 25 MMOL/L
CREAT SERPL-MCNC: 0.74 MG/DL
EGFR: 89 ML/MIN/1.73M2
GLUCOSE SERPL-MCNC: 114 MG/DL
POTASSIUM SERPL-SCNC: 3.8 MMOL/L
PROT SERPL-MCNC: 7.4 G/DL
SODIUM SERPL-SCNC: 144 MMOL/L

## 2024-08-16 NOTE — HISTORY OF PRESENT ILLNESS
[de-identified] : 64-year-old lady who had left breast conserving surgery (2024) after neoadjuvant systemic treatment (hormonal), which was followed by adjuvant radiation.  She developed a nonsuppurative infection in 2024 that required hospitalization for aspiration of a seroma, and a brief course of intravenous antibiotics, followed by completion with oral antibiotics.   2024: She woke up with a swollen right eyelid. It is not particularly uncomfortable. Ophthalmology: Dr. Joaquim GONZALEZ.  24: Admitted to [facility] with a left breast infection. Prodrome consisted of a 1-week history of left breast pain which is gradually increased. Fever 100.4 F at home. + Associated erythema. No leukocytosis on admission.  24: Left breast sonogram: 7.3 cm septated fluid collection at 7:00, deep to the surgical scar.  2024: CT chest: Left breast skin thickening with infiltration of subcutaneous fat and an underlying 7.6 x 7.1 cm rim-enhancing fluid collection.  2024: Image guided aspiration and radiology: Gram stain: + PMNs. No organisms seen. Culture results: No growth.  During the hospitalization she was treated with intravenous cefazolin.  She was discharged home on the same medication. At her July 15, 2024, office visit with me she was feeling better. Local symptoms had resolved. No constitutional signs or symptoms.  7/15/2024: Follow-up left breast sonogram at 450: BI-RADS 2. Fluid collection seen at 7:00 of the conserved left breast.  2024: Sonogram guided aspiration of left breast complicated cystic fluid collection at 450: 85 cc of thin yellow fluid aspirated. Cytology: Negative for malignant cells. Gram stain: + PMNs, no organisms.  2024: Continued improvement in local infectious and inflammatory changes, without any worrisome findings.   Tyrer-Cuzick risk score = 8.1.   2024: LEFT BREAST conserving surgery with oncoplastic closure by Dr. Ashok VINCENT, after a 3-month course of neoadjuvant anastrozole. Surgical pathology: T = 3.9 cm, negative margins. 0/3 left axillary LN. + ER/[place].  2024: Commenced radiation therapy. Completed 3/11/2024. Radiation oncology: Dr. Jasmin ESTES.  She has remained on anastrozole. Medical oncology: Dr. Tripp GAGNON.   2023: Referred with a ~4.4 cm invasive cancer of her LEFT BREAST (LIQ). This was an asymptomatic nonpalpable finding identified in the medial left breast on CT scan of the chest performed for lung cancer screening.   2023: Preoperative breast MRI: BI-RADS 4. + Additional ipsilateral (left breast) anterior to the known malignancy. Subsequent MRI guided core biopsy was benign and concordant.  2023: Initiated neoadjuvant systemic therapy with anastrozole. Medical oncology: Dr. Tripp GAGNON.   Genetic testing ([employer] 84 gene assay: Negative 2023: VUS in RET.   Remainder of her extent of disease evaluation: May 2023: CT abdomen and pelvis was unremarkable. 2023 CT chest: Equivocal findings. 2023: Follow-up CT chest: No interval changes. May 2024: Follow-up CT chest system: No interval changes. Annual follow-up recommended for May 2025. Prescription entered/provided at her 2024 visit.   No previous personal history of breast disease. No prior breast biopsies.   + Prior personal history of malignancy: 2016 (she was 56 years old) right partial nephrectomy for RIGHT KIDNEY CANCER (RCCA). No adjuvant therapy required. Continues to follow-up with her urologist: Dr. Praveen VERGARA.  No other personal history of malignancy.   + FH: Maternal cousin: Breast cancer. Paternal cousin also had breast cancer. Not sure if either one of them had genetic testing.  A maternal aunt may have had ovarian cancer, our patient is not certain.  Not Ashkenazi.  + Additional family history of malignancy: Brother: Lung cancer. A paternal uncle also had lung cancer.   Menarche at 9.  1, para 1 at 36. Natural menopause at 48. No HRT   PMD: Dr. Gabe SERRATO.  NKDA.  No pacemaker or defibrillator. No anticoagulants  + Hypertension. Treated with amlodipine. She does not see a cardiologist.  + Daily anastrozole. Started (neoadjuvant) 2023 after the breast cancer diagnosis.  + History of right renal cell cancer (RCCA). Right partial nephrectomy in 2016. + Left renal angiolipoma. Urology: Dr. Praveen VERGARA  + Ex-smoker. Pulmonary: Gianinina ALEXANDRE, PA with the health system.   She has not seen a gynecologist since age 38.   She has never had a colonoscopy.

## 2024-08-16 NOTE — REVIEW OF SYSTEMS
[Negative] : Endocrine [FreeTextEntry6] :  previous smoker. [FreeTextEntry5] :   Hypertension [FreeTextEntry7] :  NKDA [FreeTextEntry9] :  history of kidney cancer [FreeTextEntry1] :  breast cancer

## 2024-08-16 NOTE — PHYSICAL EXAM
[Normal] : supple, no neck mass and thyroid not enlarged [Normal Neck Lymph Nodes] : normal neck lymph nodes  [Normal Supraclavicular Lymph Nodes] : normal supraclavicular lymph nodes [Normal Axillary Lymph Nodes] : normal axillary lymph nodes [Normal] : normal appearance, no rash, nodules, vesicles, ulcers, erythema [de-identified] : Groins not examined [de-identified] : Below

## 2024-08-16 NOTE — ASSESSMENT
[FreeTextEntry1] : 64-year-old lady.  Admitted 7/7/2024 for 48 hours to [facility] with left breast pain, skin edema, Had percutaneous aspiration of the fluid by interventional radiology. Culture results negative. Discharged home on oral antibiotics.  Today is her fourth office visit since above episode.  No fever, sweats, or chills. The erythema and induration continue to subside.    7/15/2024: Left breast sonogram at 450: BI-RADS 2. Persistent fluid collection in the left breast at 7:00. 7/19/2024: 85 cc of thin clear yellow fluid aspirated by radiology under ultrasound guidance. Cytology: No malignant cells. Gram stain: + PMNs, no organisms.  August 20, 2024, she is going on a 1-week cruise.    January 2024: Left breast conserving surgery after a 3-month course of neoadjuvant anastrozole.  Started radiation therapy February 2024.  Remains on anastrozole.  Follow-up CT scan of the chest is due May 2025. Prescription entered/provided at her June 2024 visit.  Annual mammogram should be August 2024. Prescription entered/provided after June 2024 visit.

## 2024-08-16 NOTE — REASON FOR VISIT
[Other: _____] : [unfilled] [FreeTextEntry2] :   Fourth office visit for a nonsuppurative infection (delayed) in her previously conserved left breast

## 2024-08-17 NOTE — PHYSICAL EXAM
[Restricted in physically strenuous activity but ambulatory and able to carry out work of a light or sedentary nature] : Status 1- Restricted in physically strenuous activity but ambulatory and able to carry out work of a light or sedentary nature, e.g., light house work, office work [Normal] : affect appropriate [Fully active, able to carry on all pre-disease performance without restriction] : Status 0 - Fully active, able to carry on all pre-disease performance without restriction [de-identified] : left breast eythema/darkening of skin color/swelling [de-identified] : darkening of skin on left breast

## 2024-08-17 NOTE — ASSESSMENT
[FreeTextEntry1] : Malignant neoplasm of lower-inner quadrant of left breast in female, estrogen receptor positive (174.3,V86.0) (C50.312,Z17.0) She is a 63 y/o  F with clinical T2N0 ER positive, MI positive, Her2 negative breast cancer: mucinous histology. She started on endocrine neoadjuvant therapy on 9/2023 with anastrozole. She underwent lumpectomy with Dr Lopez and had residual disease: invasive mucinous carcinoma and negative SLN. She completed RT to the L breast and restarted anastrozole 3/2024. We reviewed low fat diet and exercise daily to help improve breast cancer survival as per WINS study. We reviewed calcium and Vitamin D supplementation along with exercise to maintain bone health. Encourage topical ointment (oncology body cream) and gentle massage of left breast to decrease fluid accumulation/swelling. lab today and will call pt about the results.  Next follow up in 4 months but earlier if any new symptoms.  Pulmonary nodule: ROLF seen on CT chest 6/2023: had interval CT in 11/2023, and May 2024 showing unchanged left upper lobe mass cysts and a very small nodule. Lung RADS 2: Recommend low dose screening chest CT in one year.  Low vitamin D: will repeat on next visit. encourage pt to take the supplement.

## 2024-08-17 NOTE — PHYSICAL EXAM
[Restricted in physically strenuous activity but ambulatory and able to carry out work of a light or sedentary nature] : Status 1- Restricted in physically strenuous activity but ambulatory and able to carry out work of a light or sedentary nature, e.g., light house work, office work [Normal] : affect appropriate [Fully active, able to carry on all pre-disease performance without restriction] : Status 0 - Fully active, able to carry on all pre-disease performance without restriction [de-identified] : left breast eythema/darkening of skin color/swelling [de-identified] : darkening of skin on left breast

## 2024-08-17 NOTE — REVIEW OF SYSTEMS
[Diarrhea: Grade 0] : Diarrhea: Grade 0 [Negative] : Allergic/Immunologic [Fever] : no fever [Chills] : no chills [Night Sweats] : no night sweats [Fatigue] : no fatigue [Recent Change In Weight] : ~T no recent weight change [Eye Pain] : no eye pain [Vision Problems] : no vision problems [Skin Wound] : no skin wound [de-identified] : left breast skin erythema/darkening/swelling

## 2024-08-17 NOTE — HISTORY OF PRESENT ILLNESS
[de-identified] : Age 56: R RCC s/p partial nephrectomy with Dr Troy Age 63: left breast cancer Incidental finding from CT chest showing indeterminant 3 cm medial L breast mass along with indeterminate pulmonary nodules. She had mammogram/ sonogram done on 8/10/2023 which showed left breast 7:00 9 cm from the nipple correlating with mammographic mass measuring 4.4 x 2.4 x 3.6 cm. She had left 7:00 u/s guided biopsy on 8/14/2023. The pathology showed invasive mucinous carcinoma SBR 6/9 measuring at least 15 mm, ER > 90%, TN 80%, Her2 negative (1). She had MRI of the breast done on 8/24/2023 which showed irregular heterogeneously enhancing mass containing a biopsy clip measuring 3.3 x 3.2 x 2.4 cm compatible with biopsy proven carcinoma, no significant axillary or internal mammary adenopathy, few foci of enhancement surrounding the dominant biopsy proven carcinoma suspicious for satellite lesions. She had MRI guided biopsy of the satellite lesions which showed benign breast parenchyma with stromal fibrosis, apocrine metaplasia and cystic changes. She started on anastrozole 9/2023 as neoadjuvant therapy. She underwent lumpectomy with SLNB with Dr Lopez on 1/17/2024. 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 She underwent RT with Dr Urias and completed RT 3/2024. She resumed anastrozole 3/2024.   Disease: left breast cancer    Pathology: invasive mucinous carcinoma ER > 90%, TN 80%, Her2 negative   TNM stage: T2   anastrozole 9/2023 to present   [de-identified] : 8/12/24: Resumed anastrozole on 3/25/2024.She had procedures of aspirating left breast fluid collection (7/19/24 and 7/8/24) with cytology negative for malignant cells. She reports improvement of breast swelling after the procedures. She has mild intermittent hot flashes but denies new onset of joint pain, worsening of fatigue, SOB, and cough. Denies depressed mood, anxiety.

## 2024-08-17 NOTE — PHYSICAL EXAM
[Restricted in physically strenuous activity but ambulatory and able to carry out work of a light or sedentary nature] : Status 1- Restricted in physically strenuous activity but ambulatory and able to carry out work of a light or sedentary nature, e.g., light house work, office work [Normal] : affect appropriate [Fully active, able to carry on all pre-disease performance without restriction] : Status 0 - Fully active, able to carry on all pre-disease performance without restriction [de-identified] : left breast eythema/darkening of skin color/swelling [de-identified] : darkening of skin on left breast

## 2024-08-17 NOTE — REVIEW OF SYSTEMS
[Diarrhea: Grade 0] : Diarrhea: Grade 0 [Negative] : Allergic/Immunologic [Fever] : no fever [Chills] : no chills [Night Sweats] : no night sweats [Fatigue] : no fatigue [Recent Change In Weight] : ~T no recent weight change [Eye Pain] : no eye pain [Vision Problems] : no vision problems [Skin Wound] : no skin wound [de-identified] : left breast skin erythema/darkening/swelling

## 2024-08-17 NOTE — PHYSICAL EXAM
[Restricted in physically strenuous activity but ambulatory and able to carry out work of a light or sedentary nature] : Status 1- Restricted in physically strenuous activity but ambulatory and able to carry out work of a light or sedentary nature, e.g., light house work, office work [Normal] : affect appropriate [Fully active, able to carry on all pre-disease performance without restriction] : Status 0 - Fully active, able to carry on all pre-disease performance without restriction [de-identified] : left breast eythema/darkening of skin color/swelling [de-identified] : darkening of skin on left breast

## 2024-08-17 NOTE — ASSESSMENT
[FreeTextEntry1] : Malignant neoplasm of lower-inner quadrant of left breast in female, estrogen receptor positive (174.3,V86.0) (C50.312,Z17.0) She is a 65 y/o  F with clinical T2N0 ER positive, RI positive, Her2 negative breast cancer: mucinous histology. She started on endocrine neoadjuvant therapy on 9/2023 with anastrozole. She underwent lumpectomy with Dr Lopez and had residual disease: invasive mucinous carcinoma and negative SLN. She completed RT to the L breast and restarted anastrozole 3/2024. We reviewed low fat diet and exercise daily to help improve breast cancer survival as per WINS study. We reviewed calcium and Vitamin D supplementation along with exercise to maintain bone health. Encourage topical ointment (oncology body cream) and gentle massage of left breast to decrease fluid accumulation/swelling. lab today and will call pt about the results.  Next follow up in 4 months but earlier if any new symptoms.  Pulmonary nodule: ROLF seen on CT chest 6/2023: had interval CT in 11/2023, and May 2024 showing unchanged left upper lobe mass cysts and a very small nodule. Lung RADS 2: Recommend low dose screening chest CT in one year.  Low vitamin D: will repeat on next visit. encourage pt to take the supplement.

## 2024-08-17 NOTE — REVIEW OF SYSTEMS
[Diarrhea: Grade 0] : Diarrhea: Grade 0 [Negative] : Allergic/Immunologic [Fever] : no fever [Chills] : no chills [Night Sweats] : no night sweats [Fatigue] : no fatigue [Recent Change In Weight] : ~T no recent weight change [Eye Pain] : no eye pain [Vision Problems] : no vision problems [Skin Wound] : no skin wound [de-identified] : left breast skin erythema/darkening/swelling

## 2024-08-17 NOTE — HISTORY OF PRESENT ILLNESS
[de-identified] : Age 56: R RCC s/p partial nephrectomy with Dr Troy Age 63: left breast cancer Incidental finding from CT chest showing indeterminant 3 cm medial L breast mass along with indeterminate pulmonary nodules. She had mammogram/ sonogram done on 8/10/2023 which showed left breast 7:00 9 cm from the nipple correlating with mammographic mass measuring 4.4 x 2.4 x 3.6 cm. She had left 7:00 u/s guided biopsy on 8/14/2023. 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 She had MRI of the breast done on 8/24/2023 which showed irregular heterogeneously enhancing mass containing a biopsy clip measuring 3.3 x 3.2 x 2.4 cm compatible with biopsy proven carcinoma, no significant axillary or internal mammary adenopathy, few foci of enhancement surrounding the dominant biopsy proven carcinoma suspicious for satellite lesions. She had MRI guided biopsy of the satellite lesions which showed benign breast parenchyma with stromal fibrosis, apocrine metaplasia and cystic changes. She started on anastrozole 9/2023 as neoadjuvant therapy. She underwent lumpectomy with SLNB with Dr Lopez on 1/17/2024. 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 She underwent RT with Dr Urias and completed RT 3/2024. She resumed anastrozole 3/2024.   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 She reports improvement of breast swelling after the procedures. She has mild intermittent hot flashes but denies new onset of joint pain, worsening of fatigue, SOB, and cough. Denies depressed mood, anxiety.

## 2024-08-17 NOTE — END OF VISIT
[] : Fellow [FreeTextEntry3] : She completed RT and remains on AI therapy. No new findings or masses palpable on exam. We reviewed bone health. Will obtain interval CMP and Vitamin D. We reviewed signs and symptoms of breast cancer recurrence. No new signs or symptoms of recurrence.   [Time Spent: ___ minutes] : I have spent [unfilled] minutes of time on the encounter.

## 2024-08-17 NOTE — ASSESSMENT
[FreeTextEntry1] : Malignant neoplasm of lower-inner quadrant of left breast in female, estrogen receptor positive (174.3,V86.0) (C50.312,Z17.0) She is a 63 y/o  F with clinical T2N0 ER positive, TN positive, Her2 negative breast cancer: mucinous histology. She started on endocrine neoadjuvant therapy on 9/2023 with anastrozole. She underwent lumpectomy with Dr Lopez and had residual disease: invasive mucinous carcinoma and negative SLN. She completed RT to the L breast and restarted anastrozole 3/2024. We reviewed low fat diet and exercise daily to help improve breast cancer survival as per WINS study. We reviewed calcium and Vitamin D supplementation along with exercise to maintain bone health. Encourage topical ointment (oncology body cream) and gentle massage of left breast to decrease fluid accumulation/swelling. lab today and will call pt about the results.  Next follow up in 4 months but earlier if any new symptoms.  Pulmonary nodule: ROLF seen on CT chest 6/2023: had interval CT in 11/2023, and May 2024 showing unchanged left upper lobe mass cysts and a very small nodule. Lung RADS 2: Recommend low dose screening chest CT in one year.  Low vitamin D: will repeat on next visit. encourage pt to take the supplement.

## 2024-08-17 NOTE — ASSESSMENT
[FreeTextEntry1] : Malignant neoplasm of lower-inner quadrant of left breast in female, estrogen receptor positive (174.3,V86.0) (C50.312,Z17.0) She is a 65 y/o  F with clinical T2N0 ER positive, IA positive, Her2 negative breast cancer: mucinous histology. She started on endocrine neoadjuvant therapy on 9/2023 with anastrozole. She underwent lumpectomy with Dr Lopez and had residual disease: invasive mucinous carcinoma and negative SLN. She completed RT to the L breast and restarted anastrozole 3/2024. We reviewed low fat diet and exercise daily to help improve breast cancer survival as per WINS study. We reviewed calcium and Vitamin D supplementation along with exercise to maintain bone health. Encourage topical ointment (oncology body cream) and gentle massage of left breast to decrease fluid accumulation/swelling. lab today and will call pt about the results.  Next follow up in 4 months but earlier if any new symptoms.  Pulmonary nodule: ROLF seen on CT chest 6/2023: had interval CT in 11/2023, and May 2024 showing unchanged left upper lobe mass cysts and a very small nodule. Lung RADS 2: Recommend low dose screening chest CT in one year.  Low vitamin D: will repeat on next visit. encourage pt to take the supplement.

## 2024-08-17 NOTE — HISTORY OF PRESENT ILLNESS
[de-identified] : Age 56: R RCC s/p partial nephrectomy with Dr Troy Age 63: left breast cancer Incidental finding from CT chest showing indeterminant 3 cm medial L breast mass along with indeterminate pulmonary nodules. She had mammogram/ sonogram done on 8/10/2023 which showed left breast 7:00 9 cm from the nipple correlating with mammographic mass measuring 4.4 x 2.4 x 3.6 cm. She had left 7:00 u/s guided biopsy on 8/14/2023. 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 She had MRI of the breast done on 8/24/2023 which showed irregular heterogeneously enhancing mass containing a biopsy clip measuring 3.3 x 3.2 x 2.4 cm compatible with biopsy proven carcinoma, no significant axillary or internal mammary adenopathy, few foci of enhancement surrounding the dominant biopsy proven carcinoma suspicious for satellite lesions. She had MRI guided biopsy of the satellite lesions which showed benign breast parenchyma with stromal fibrosis, apocrine metaplasia and cystic changes. She started on anastrozole 9/2023 as neoadjuvant therapy. She underwent lumpectomy with SLNB with Dr Lopez on 1/17/2024. 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 She underwent RT with Dr Urias and completed RT 3/2024. She resumed anastrozole 3/2024.   Disease: left breast cancer    Pathology: invasive mucinous carcinoma ER > 90%, ID 80%, Her2 negative   TNM stage: T2   anastrozole 9/2023 to present   [de-identified] : 8/12/24: Resumed anastrozole on 3/25/2024.She had procedures of aspirating left breast fluid collection (7/19/24 and 7/8/24) with cytology negative for malignant cells. She reports improvement of breast swelling after the procedures. She has mild intermittent hot flashes but denies new onset of joint pain, worsening of fatigue, SOB, and cough. Denies depressed mood, anxiety.

## 2024-08-17 NOTE — REVIEW OF SYSTEMS
[Diarrhea: Grade 0] : Diarrhea: Grade 0 [Negative] : Allergic/Immunologic [Fever] : no fever [Chills] : no chills [Night Sweats] : no night sweats [Fatigue] : no fatigue [Recent Change In Weight] : ~T no recent weight change [Eye Pain] : no eye pain [Vision Problems] : no vision problems [Skin Wound] : no skin wound [de-identified] : left breast skin erythema/darkening/swelling

## 2024-08-17 NOTE — HISTORY OF PRESENT ILLNESS
[de-identified] : Age 56: R RCC s/p partial nephrectomy with Dr Troy Age 63: left breast cancer Incidental finding from CT chest showing indeterminant 3 cm medial L breast mass along with indeterminate pulmonary nodules. She had mammogram/ sonogram done on 8/10/2023 which showed left breast 7:00 9 cm from the nipple correlating with mammographic mass measuring 4.4 x 2.4 x 3.6 cm. She had left 7:00 u/s guided biopsy on 8/14/2023. 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 She had MRI of the breast done on 8/24/2023 which showed irregular heterogeneously enhancing mass containing a biopsy clip measuring 3.3 x 3.2 x 2.4 cm compatible with biopsy proven carcinoma, no significant axillary or internal mammary adenopathy, few foci of enhancement surrounding the dominant biopsy proven carcinoma suspicious for satellite lesions. She had MRI guided biopsy of the satellite lesions which showed benign breast parenchyma with stromal fibrosis, apocrine metaplasia and cystic changes. She started on anastrozole 9/2023 as neoadjuvant therapy. She underwent lumpectomy with SLNB with Dr Lopez on 1/17/2024. 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 She underwent RT with Dr Urias and completed RT 3/2024. She resumed anastrozole 3/2024.   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 She reports improvement of breast swelling after the procedures. She has mild intermittent hot flashes but denies new onset of joint pain, worsening of fatigue, SOB, and cough. Denies depressed mood, anxiety.

## 2024-08-19 ENCOUNTER — RESULT REVIEW (OUTPATIENT)
Age: 64
End: 2024-08-19

## 2024-08-19 ENCOUNTER — OUTPATIENT (OUTPATIENT)
Dept: OUTPATIENT SERVICES | Facility: HOSPITAL | Age: 64
LOS: 1 days | End: 2024-08-19
Payer: MEDICARE

## 2024-08-19 ENCOUNTER — APPOINTMENT (OUTPATIENT)
Dept: ULTRASOUND IMAGING | Facility: IMAGING CENTER | Age: 64
End: 2024-08-19

## 2024-08-19 ENCOUNTER — APPOINTMENT (OUTPATIENT)
Dept: SURGICAL ONCOLOGY | Facility: CLINIC | Age: 64
End: 2024-08-19

## 2024-08-19 ENCOUNTER — APPOINTMENT (OUTPATIENT)
Dept: ULTRASOUND IMAGING | Facility: IMAGING CENTER | Age: 64
End: 2024-08-19
Payer: MEDICARE

## 2024-08-19 DIAGNOSIS — Z90.49 ACQUIRED ABSENCE OF OTHER SPECIFIED PARTS OF DIGESTIVE TRACT: Chronic | ICD-10-CM

## 2024-08-19 DIAGNOSIS — C50.312 MALIGNANT NEOPLASM OF LOWER-INNER QUADRANT OF LEFT FEMALE BREAST: ICD-10-CM

## 2024-08-19 DIAGNOSIS — Z17.0 MALIGNANT NEOPLASM OF LOWER-INNER QUADRANT OF LEFT FEMALE BREAST: ICD-10-CM

## 2024-08-19 DIAGNOSIS — Z90.89 ACQUIRED ABSENCE OF OTHER ORGANS: Chronic | ICD-10-CM

## 2024-08-19 DIAGNOSIS — Z00.8 ENCOUNTER FOR OTHER GENERAL EXAMINATION: ICD-10-CM

## 2024-08-19 PROCEDURE — 76642 ULTRASOUND BREAST LIMITED: CPT | Mod: 26,LT

## 2024-08-19 PROCEDURE — 76642 ULTRASOUND BREAST LIMITED: CPT | Mod: 26,LT,77

## 2024-08-19 PROCEDURE — 76642 ULTRASOUND BREAST LIMITED: CPT

## 2024-08-19 RX ORDER — CEPHALEXIN 500 MG/1
500 TABLET ORAL
Qty: 20 | Refills: 0 | Status: ACTIVE | COMMUNITY
Start: 2024-08-19 | End: 1900-01-01

## 2024-09-09 ENCOUNTER — RESULT REVIEW (OUTPATIENT)
Age: 64
End: 2024-09-09

## 2024-09-09 ENCOUNTER — APPOINTMENT (OUTPATIENT)
Dept: MAMMOGRAPHY | Facility: IMAGING CENTER | Age: 64
End: 2024-09-09
Payer: MEDICARE

## 2024-09-09 ENCOUNTER — OUTPATIENT (OUTPATIENT)
Dept: OUTPATIENT SERVICES | Facility: HOSPITAL | Age: 64
LOS: 1 days | End: 2024-09-09
Payer: MEDICARE

## 2024-09-09 ENCOUNTER — APPOINTMENT (OUTPATIENT)
Dept: ULTRASOUND IMAGING | Facility: IMAGING CENTER | Age: 64
End: 2024-09-09
Payer: MEDICARE

## 2024-09-09 DIAGNOSIS — D30.02 BENIGN NEOPLASM OF LEFT KIDNEY: Chronic | ICD-10-CM

## 2024-09-09 DIAGNOSIS — Z90.89 ACQUIRED ABSENCE OF OTHER ORGANS: Chronic | ICD-10-CM

## 2024-09-09 DIAGNOSIS — Z90.49 ACQUIRED ABSENCE OF OTHER SPECIFIED PARTS OF DIGESTIVE TRACT: Chronic | ICD-10-CM

## 2024-09-09 DIAGNOSIS — C50.312 MALIGNANT NEOPLASM OF LOWER-INNER QUADRANT OF LEFT FEMALE BREAST: ICD-10-CM

## 2024-09-09 PROCEDURE — 76641 ULTRASOUND BREAST COMPLETE: CPT | Mod: 26,50

## 2024-09-09 PROCEDURE — G0279: CPT

## 2024-09-09 PROCEDURE — 77066 DX MAMMO INCL CAD BI: CPT | Mod: 26

## 2024-09-09 PROCEDURE — G0279: CPT | Mod: 26

## 2024-09-09 PROCEDURE — 76641 ULTRASOUND BREAST COMPLETE: CPT

## 2024-09-09 PROCEDURE — 77066 DX MAMMO INCL CAD BI: CPT

## 2024-10-28 ENCOUNTER — APPOINTMENT (OUTPATIENT)
Dept: RADIATION ONCOLOGY | Facility: CLINIC | Age: 64
End: 2024-10-28
Payer: MEDICARE

## 2024-10-28 VITALS
OXYGEN SATURATION: 97 % | WEIGHT: 267.86 LBS | HEIGHT: 64 IN | DIASTOLIC BLOOD PRESSURE: 89 MMHG | RESPIRATION RATE: 17 BRPM | TEMPERATURE: 96.98 F | HEART RATE: 67 BPM | SYSTOLIC BLOOD PRESSURE: 156 MMHG | BODY MASS INDEX: 45.73 KG/M2

## 2024-10-28 DIAGNOSIS — Z17.0 MALIGNANT NEOPLASM OF LOWER-INNER QUADRANT OF LEFT FEMALE BREAST: ICD-10-CM

## 2024-10-28 DIAGNOSIS — C50.312 MALIGNANT NEOPLASM OF LOWER-INNER QUADRANT OF LEFT FEMALE BREAST: ICD-10-CM

## 2024-10-28 PROCEDURE — 99213 OFFICE O/P EST LOW 20 MIN: CPT

## 2024-10-28 RX ORDER — CHROMIUM 200 MCG
TABLET ORAL
Refills: 0 | Status: ACTIVE | COMMUNITY

## 2024-11-04 ENCOUNTER — APPOINTMENT (OUTPATIENT)
Dept: INTERNAL MEDICINE | Facility: CLINIC | Age: 64
End: 2024-11-04
Payer: MEDICARE

## 2024-11-04 VITALS
TEMPERATURE: 208.22 F | HEART RATE: 68 BPM | BODY MASS INDEX: 44.73 KG/M2 | WEIGHT: 262 LBS | HEIGHT: 64 IN | OXYGEN SATURATION: 97 % | RESPIRATION RATE: 16 BRPM

## 2024-11-04 VITALS — DIASTOLIC BLOOD PRESSURE: 80 MMHG | SYSTOLIC BLOOD PRESSURE: 140 MMHG

## 2024-11-04 DIAGNOSIS — R91.1 SOLITARY PULMONARY NODULE: ICD-10-CM

## 2024-11-04 DIAGNOSIS — Z87.891 PERSONAL HISTORY OF NICOTINE DEPENDENCE: ICD-10-CM

## 2024-11-04 DIAGNOSIS — I10 ESSENTIAL (PRIMARY) HYPERTENSION: ICD-10-CM

## 2024-11-04 DIAGNOSIS — E66.01 MORBID (SEVERE) OBESITY DUE TO EXCESS CALORIES: ICD-10-CM

## 2024-11-04 DIAGNOSIS — R73.03 PREDIABETES.: ICD-10-CM

## 2024-11-04 PROCEDURE — 99204 OFFICE O/P NEW MOD 45 MIN: CPT

## 2024-11-04 PROCEDURE — G2211 COMPLEX E/M VISIT ADD ON: CPT

## 2024-11-05 LAB
CHOLEST SERPL-MCNC: 155 MG/DL
ESTIMATED AVERAGE GLUCOSE: 111 MG/DL
HBA1C MFR BLD HPLC: 5.5 %
HDLC SERPL-MCNC: 39 MG/DL
LDLC SERPL CALC-MCNC: 80 MG/DL
NONHDLC SERPL-MCNC: 116 MG/DL
TRIGL SERPL-MCNC: 212 MG/DL

## 2024-11-18 ENCOUNTER — APPOINTMENT (OUTPATIENT)
Dept: CT IMAGING | Facility: CLINIC | Age: 64
End: 2024-11-18
Payer: MEDICARE

## 2024-11-18 PROCEDURE — 71250 CT THORAX DX C-: CPT

## 2024-12-14 ENCOUNTER — RX RENEWAL (OUTPATIENT)
Age: 64
End: 2024-12-14

## 2024-12-14 NOTE — ED ADULT TRIAGE NOTE - NSWEIGHTCALCTOOLDRUG_GEN_A_CORE
Patient could not  perform PT again 2/2 pain.  I am getting a CT L spine.  I did also decrease the gabapentin d/t oversedation.  I did call daughter and inform her of plans since she had to leave before I could meet with her.     used

## 2025-03-14 ENCOUNTER — RX RENEWAL (OUTPATIENT)
Age: 65
End: 2025-03-14

## 2025-03-20 ENCOUNTER — OUTPATIENT (OUTPATIENT)
Dept: OUTPATIENT SERVICES | Facility: HOSPITAL | Age: 65
LOS: 1 days | Discharge: HOME | End: 2025-03-20

## 2025-03-20 DIAGNOSIS — D30.02 BENIGN NEOPLASM OF LEFT KIDNEY: Chronic | ICD-10-CM

## 2025-03-20 DIAGNOSIS — Z90.49 ACQUIRED ABSENCE OF OTHER SPECIFIED PARTS OF DIGESTIVE TRACT: Chronic | ICD-10-CM

## 2025-03-20 DIAGNOSIS — Z90.89 ACQUIRED ABSENCE OF OTHER ORGANS: Chronic | ICD-10-CM

## 2025-03-20 DIAGNOSIS — C44.191 OTHER SPECIFIED MALIGNANT NEOPLASM OF SKIN OF UNSPECIFIED EYELID, INCLUDING CANTHUS: ICD-10-CM

## 2025-03-24 ENCOUNTER — APPOINTMENT (OUTPATIENT)
Dept: HEMATOLOGY ONCOLOGY | Facility: CLINIC | Age: 65
End: 2025-03-24
Payer: MEDICARE

## 2025-03-24 ENCOUNTER — RESULT REVIEW (OUTPATIENT)
Age: 65
End: 2025-03-24

## 2025-03-24 VITALS
RESPIRATION RATE: 17 BRPM | DIASTOLIC BLOOD PRESSURE: 84 MMHG | SYSTOLIC BLOOD PRESSURE: 166 MMHG | TEMPERATURE: 97.2 F | OXYGEN SATURATION: 97 % | BODY MASS INDEX: 44.77 KG/M2 | WEIGHT: 260.8 LBS | HEART RATE: 71 BPM

## 2025-03-24 DIAGNOSIS — C50.312 MALIGNANT NEOPLASM OF LOWER-INNER QUADRANT OF LEFT FEMALE BREAST: ICD-10-CM

## 2025-03-24 DIAGNOSIS — Z17.0 MALIGNANT NEOPLASM OF LOWER-INNER QUADRANT OF LEFT FEMALE BREAST: ICD-10-CM

## 2025-03-24 LAB
BASOPHILS # BLD AUTO: 0.07 K/UL — SIGNIFICANT CHANGE UP (ref 0–0.2)
BASOPHILS NFR BLD AUTO: 1.1 % — SIGNIFICANT CHANGE UP (ref 0–2)
EOSINOPHIL # BLD AUTO: 0.12 K/UL — SIGNIFICANT CHANGE UP (ref 0–0.5)
EOSINOPHIL NFR BLD AUTO: 1.8 % — SIGNIFICANT CHANGE UP (ref 0–6)
HCT VFR BLD CALC: 47.3 % — HIGH (ref 34.5–45)
HGB BLD-MCNC: 14.8 G/DL — SIGNIFICANT CHANGE UP (ref 11.5–15.5)
IMM GRANULOCYTES NFR BLD AUTO: 0.2 % — SIGNIFICANT CHANGE UP (ref 0–0.9)
LYMPHOCYTES # BLD AUTO: 1.48 K/UL — SIGNIFICANT CHANGE UP (ref 1–3.3)
LYMPHOCYTES # BLD AUTO: 22.5 % — SIGNIFICANT CHANGE UP (ref 13–44)
MCHC RBC-ENTMCNC: 25.3 PG — LOW (ref 27–34)
MCHC RBC-ENTMCNC: 31.3 G/DL — LOW (ref 32–36)
MCV RBC AUTO: 81 FL — SIGNIFICANT CHANGE UP (ref 80–100)
MONOCYTES # BLD AUTO: 0.46 K/UL — SIGNIFICANT CHANGE UP (ref 0–0.9)
MONOCYTES NFR BLD AUTO: 7 % — SIGNIFICANT CHANGE UP (ref 2–14)
NEUTROPHILS # BLD AUTO: 4.43 K/UL — SIGNIFICANT CHANGE UP (ref 1.8–7.4)
NEUTROPHILS NFR BLD AUTO: 67.4 % — SIGNIFICANT CHANGE UP (ref 43–77)
NRBC BLD AUTO-RTO: 0 /100 WBCS — SIGNIFICANT CHANGE UP (ref 0–0)
PLATELET # BLD AUTO: 295 K/UL — SIGNIFICANT CHANGE UP (ref 150–400)
RBC # BLD: 5.84 M/UL — HIGH (ref 3.8–5.2)
RBC # FLD: 14.5 % — SIGNIFICANT CHANGE UP (ref 10.3–14.5)
WBC # BLD: 6.57 K/UL — SIGNIFICANT CHANGE UP (ref 3.8–10.5)
WBC # FLD AUTO: 6.57 K/UL — SIGNIFICANT CHANGE UP (ref 3.8–10.5)

## 2025-03-24 PROCEDURE — 99214 OFFICE O/P EST MOD 30 MIN: CPT

## 2025-03-24 PROCEDURE — G2211 COMPLEX E/M VISIT ADD ON: CPT

## 2025-03-25 LAB
ALBUMIN SERPL ELPH-MCNC: 4.6 G/DL
ALP BLD-CCNC: 136 U/L
ALT SERPL-CCNC: 10 U/L
ANION GAP SERPL CALC-SCNC: 14 MMOL/L
AST SERPL-CCNC: 13 U/L
BILIRUB SERPL-MCNC: 0.4 MG/DL
BUN SERPL-MCNC: 16 MG/DL
CALCIUM SERPL-MCNC: 9.8 MG/DL
CANCER AG27-29 SERPL-ACNC: 31.9 U/ML
CHLORIDE SERPL-SCNC: 104 MMOL/L
CO2 SERPL-SCNC: 25 MMOL/L
CREAT SERPL-MCNC: 0.67 MG/DL
EGFRCR SERPLBLD CKD-EPI 2021: 96 ML/MIN/1.73M2
GLUCOSE SERPL-MCNC: 101 MG/DL
MAGNESIUM SERPL-MCNC: 2 MG/DL
POTASSIUM SERPL-SCNC: 4.8 MMOL/L
PROT SERPL-MCNC: 7.7 G/DL
SODIUM SERPL-SCNC: 143 MMOL/L

## 2025-04-22 NOTE — DISEASE MANAGEMENT
Forwarded to Dr. Leslie,  Please review and advise    1) Pt's MYCHART Reply   Arminda Greenwood to [facility] [address] Neph Dep Myc Msg Pool (supporting You)   FT      4/22/25  3:39 PM  Stopped protonix on wednesday, April 17th, 2025. Magnesuim 800 mg 2 x a day. Morning and evening = 1600mg daily.      2) Pt awaiting reply regarding scheduling a NPT 40 min [facility] hospital follow up appt.    [Pathological] : TNM Stage: p [IIA] : IIA [TTNM] : 2 [NTNM] : 0 [de-identified] : 1196 [MTNM] : 0 [de-identified] : [id number] [de-identified] : Left Breast

## 2025-04-28 ENCOUNTER — APPOINTMENT (OUTPATIENT)
Dept: CT IMAGING | Facility: IMAGING CENTER | Age: 65
End: 2025-04-28
Payer: MEDICARE

## 2025-04-28 ENCOUNTER — OUTPATIENT (OUTPATIENT)
Dept: OUTPATIENT SERVICES | Facility: HOSPITAL | Age: 65
LOS: 1 days | End: 2025-04-28
Payer: MEDICARE

## 2025-04-28 ENCOUNTER — APPOINTMENT (OUTPATIENT)
Dept: RADIOLOGY | Facility: IMAGING CENTER | Age: 65
End: 2025-04-28
Payer: MEDICARE

## 2025-04-28 DIAGNOSIS — D30.02 BENIGN NEOPLASM OF LEFT KIDNEY: Chronic | ICD-10-CM

## 2025-04-28 DIAGNOSIS — Z90.89 ACQUIRED ABSENCE OF OTHER ORGANS: Chronic | ICD-10-CM

## 2025-04-28 DIAGNOSIS — C50.312 MALIGNANT NEOPLASM OF LOWER-INNER QUADRANT OF LEFT FEMALE BREAST: ICD-10-CM

## 2025-04-28 DIAGNOSIS — Z90.49 ACQUIRED ABSENCE OF OTHER SPECIFIED PARTS OF DIGESTIVE TRACT: Chronic | ICD-10-CM

## 2025-04-28 PROCEDURE — 77085 DXA BONE DENSITY AXL VRT FX: CPT | Mod: 26

## 2025-04-28 PROCEDURE — 71250 CT THORAX DX C-: CPT | Mod: 26

## 2025-04-28 PROCEDURE — 77085 DXA BONE DENSITY AXL VRT FX: CPT

## 2025-04-28 PROCEDURE — 71250 CT THORAX DX C-: CPT

## 2025-04-30 ENCOUNTER — NON-APPOINTMENT (OUTPATIENT)
Age: 65
End: 2025-04-30

## 2025-05-05 ENCOUNTER — APPOINTMENT (OUTPATIENT)
Dept: MRI IMAGING | Facility: IMAGING CENTER | Age: 65
End: 2025-05-05

## 2025-05-05 ENCOUNTER — OUTPATIENT (OUTPATIENT)
Dept: OUTPATIENT SERVICES | Facility: HOSPITAL | Age: 65
LOS: 1 days | End: 2025-05-05
Payer: MEDICARE

## 2025-05-05 ENCOUNTER — APPOINTMENT (OUTPATIENT)
Dept: MRI IMAGING | Facility: IMAGING CENTER | Age: 65
End: 2025-05-05
Payer: MEDICARE

## 2025-05-05 DIAGNOSIS — D30.02 BENIGN NEOPLASM OF LEFT KIDNEY: Chronic | ICD-10-CM

## 2025-05-05 DIAGNOSIS — Z90.89 ACQUIRED ABSENCE OF OTHER ORGANS: Chronic | ICD-10-CM

## 2025-05-05 DIAGNOSIS — Z90.49 ACQUIRED ABSENCE OF OTHER SPECIFIED PARTS OF DIGESTIVE TRACT: Chronic | ICD-10-CM

## 2025-05-05 DIAGNOSIS — C50.312 MALIGNANT NEOPLASM OF LOWER-INNER QUADRANT OF LEFT FEMALE BREAST: ICD-10-CM

## 2025-05-05 PROCEDURE — C8937: CPT

## 2025-05-05 PROCEDURE — C8908: CPT

## 2025-05-05 PROCEDURE — 77049 MRI BREAST C-+ W/CAD BI: CPT | Mod: 26

## 2025-05-05 PROCEDURE — A9585: CPT

## 2025-05-06 ENCOUNTER — NON-APPOINTMENT (OUTPATIENT)
Age: 65
End: 2025-05-06

## 2025-06-10 ENCOUNTER — RX RENEWAL (OUTPATIENT)
Age: 65
End: 2025-06-10

## 2025-06-30 ENCOUNTER — NON-APPOINTMENT (OUTPATIENT)
Age: 65
End: 2025-06-30

## 2025-06-30 ENCOUNTER — APPOINTMENT (OUTPATIENT)
Dept: INTERNAL MEDICINE | Facility: CLINIC | Age: 65
End: 2025-06-30
Payer: MEDICARE

## 2025-06-30 VITALS
WEIGHT: 269 LBS | HEIGHT: 64 IN | BODY MASS INDEX: 45.93 KG/M2 | HEART RATE: 75 BPM | RESPIRATION RATE: 16 BRPM | TEMPERATURE: 98.8 F | DIASTOLIC BLOOD PRESSURE: 75 MMHG | OXYGEN SATURATION: 94 % | SYSTOLIC BLOOD PRESSURE: 126 MMHG

## 2025-06-30 PROBLEM — M25.511 ACUTE PAIN OF RIGHT SHOULDER: Status: ACTIVE | Noted: 2025-06-30

## 2025-06-30 PROCEDURE — 99214 OFFICE O/P EST MOD 30 MIN: CPT

## 2025-06-30 PROCEDURE — G2211 COMPLEX E/M VISIT ADD ON: CPT

## 2025-09-08 ENCOUNTER — APPOINTMENT (OUTPATIENT)
Dept: SURGICAL ONCOLOGY | Facility: CLINIC | Age: 65
End: 2025-09-08
Payer: MEDICARE

## 2025-09-08 VITALS
SYSTOLIC BLOOD PRESSURE: 149 MMHG | DIASTOLIC BLOOD PRESSURE: 83 MMHG | BODY MASS INDEX: 46.1 KG/M2 | HEART RATE: 69 BPM | WEIGHT: 270 LBS | HEIGHT: 64 IN | OXYGEN SATURATION: 96 % | RESPIRATION RATE: 18 BRPM

## 2025-09-08 DIAGNOSIS — Z17.0 MALIGNANT NEOPLASM OF LOWER-INNER QUADRANT OF LEFT FEMALE BREAST: ICD-10-CM

## 2025-09-08 DIAGNOSIS — C50.312 MALIGNANT NEOPLASM OF LOWER-INNER QUADRANT OF LEFT FEMALE BREAST: ICD-10-CM

## 2025-09-08 PROCEDURE — 99215 OFFICE O/P EST HI 40 MIN: CPT

## (undated) DEVICE — VENODYNE/SCD SLEEVE CALF MEDIUM

## (undated) DEVICE — PACK MINOR

## (undated) DEVICE — DRSG MAMMARY SUPPORT MED SIZE 2

## (undated) DEVICE — GLV 7.5 PROTEXIS (CREAM) NEU-THERA

## (undated) DEVICE — LAP PAD 18 X 18"

## (undated) DEVICE — DRSG TEGADERM 1.75X1.75"

## (undated) DEVICE — DRSG MAMMARY SUPPORT MED SIZE 3

## (undated) DEVICE — DRAPE TOWEL BLUE 17" X 24"

## (undated) DEVICE — WARMING BLANKET LOWER ADULT

## (undated) DEVICE — SPECIMEN CONTAINER 100ML

## (undated) DEVICE — ELCTR STRYKER NEPTUNE SMOKE EVACUATION PENCIL (GREEN)

## (undated) DEVICE — DRAPE 1/2 SHEET 40X57"

## (undated) DEVICE — GAMMA SLEEVE DISPOSABLE

## (undated) DEVICE — DRSG MAMMARY SUPPORT LG SIZE 4

## (undated) DEVICE — SOL IRR POUR H2O 250ML

## (undated) DEVICE — SUT SOFSILK 2-0 18" C-23

## (undated) DEVICE — DRSG TELFA 3 X 8

## (undated) DEVICE — DRSG COMBINE 5X9"

## (undated) DEVICE — SOL IRR POUR NS 0.9% 500ML

## (undated) DEVICE — DRSG CURITY GAUZE SPONGE 4 X 4" 12-PLY

## (undated) DEVICE — DRAPE CHEST BREAST 106" X 122"

## (undated) DEVICE — SUT POLYSORB 2-0 30" GS-21 UNDYED

## (undated) DEVICE — SYR LUER LOK 10CC

## (undated) DEVICE — DRAPE INSTRUMENT POUCH 6.75" X 11"

## (undated) DEVICE — SUT MONOCRYL 3-0 27" PS-2 UNDYED

## (undated) DEVICE — SUT MONOCRYL 4-0 27" PS-2 UNDYED

## (undated) DEVICE — MEDICATION LABELS W MARKER